# Patient Record
Sex: MALE | Race: BLACK OR AFRICAN AMERICAN | NOT HISPANIC OR LATINO | Employment: OTHER | URBAN - METROPOLITAN AREA
[De-identification: names, ages, dates, MRNs, and addresses within clinical notes are randomized per-mention and may not be internally consistent; named-entity substitution may affect disease eponyms.]

---

## 2023-01-27 ENCOUNTER — HOSPITAL ENCOUNTER (EMERGENCY)
Facility: HOSPITAL | Age: 68
Discharge: HOME | End: 2023-01-27
Attending: EMERGENCY MEDICINE
Payer: MEDICARE

## 2023-01-27 VITALS
OXYGEN SATURATION: 99 % | TEMPERATURE: 98 F | RESPIRATION RATE: 17 BRPM | SYSTOLIC BLOOD PRESSURE: 159 MMHG | DIASTOLIC BLOOD PRESSURE: 102 MMHG | HEART RATE: 72 BPM

## 2023-01-27 DIAGNOSIS — S91.312A LACERATION OF LEFT HEEL, INITIAL ENCOUNTER: Primary | ICD-10-CM

## 2023-01-27 PROCEDURE — 87070 CULTURE OTHR SPECIMN AEROBIC: CPT

## 2023-01-27 PROCEDURE — 63600175 PHARM REV CODE 636 W HCPCS

## 2023-01-27 PROCEDURE — 25000003 PHARM REV CODE 250

## 2023-01-27 PROCEDURE — 99284 EMERGENCY DEPT VISIT MOD MDM: CPT

## 2023-01-27 PROCEDURE — 87077 CULTURE AEROBIC IDENTIFY: CPT | Mod: 59

## 2023-01-27 PROCEDURE — 90715 TDAP VACCINE 7 YRS/> IM: CPT

## 2023-01-27 PROCEDURE — 87186 SC STD MICRODIL/AGAR DIL: CPT

## 2023-01-27 PROCEDURE — 90471 IMMUNIZATION ADMIN: CPT

## 2023-01-27 RX ORDER — HYDROCODONE BITARTRATE AND ACETAMINOPHEN 5; 325 MG/1; MG/1
1 TABLET ORAL EVERY 6 HOURS PRN
Qty: 11 TABLET | Refills: 0 | Status: SHIPPED | OUTPATIENT
Start: 2023-01-27 | End: 2023-01-30

## 2023-01-27 RX ORDER — MUPIROCIN 20 MG/G
1 OINTMENT TOPICAL
Status: COMPLETED | OUTPATIENT
Start: 2023-01-27 | End: 2023-01-27

## 2023-01-27 RX ORDER — CLINDAMYCIN HYDROCHLORIDE 150 MG/1
450 CAPSULE ORAL 3 TIMES DAILY
Qty: 63 CAPSULE | Refills: 0 | Status: SHIPPED | OUTPATIENT
Start: 2023-01-27 | End: 2023-02-03

## 2023-01-27 RX ADMIN — TETANUS TOXOID, REDUCED DIPHTHERIA TOXOID AND ACELLULAR PERTUSSIS VACCINE, ADSORBED 0.5 ML: 5; 2.5; 8; 8; 2.5 SUSPENSION INTRAMUSCULAR at 01:01

## 2023-01-27 RX ADMIN — MUPIROCIN 22 G: 20 OINTMENT TOPICAL at 01:01

## 2023-01-27 NOTE — ED PROVIDER NOTES
Encounter Date: 1/27/2023       History     Chief Complaint   Patient presents with    Laceration     69 yo male to triage for laceration to left ankle. Injury happened Monday while he was in [place]. Just came back to [place] on yesterday. JOSHUA, NAD, AAOx4     68-year-old male with PMHx of GERD presents to ED for emergent evaluation of a 5 cm laceration to left heel that happened 4 days ago.  Patient states that he accidentally cut his heel on a the edge of the bed. He denies any LOC or head trauma. He denies any associated fever, chills, chest pain, SOB, abdominal pain, N/V/D, dysuria, or hematuria. He denies any attempted treatment. He denies any recent abx. No other symptoms reported.    The history is provided by the patient. The history is limited by a language barrier ([id number]). A  was used.   Review of patient's allergies indicates:  No Known Allergies  No past medical history on file.  No past surgical history on file.  No family history on file.     Review of Systems   Constitutional:  Negative for chills and fever.   HENT:  Negative for congestion, ear pain, rhinorrhea and sore throat.    Eyes:  Negative for redness.   Respiratory:  Negative for cough and shortness of breath.    Cardiovascular:  Negative for chest pain.   Gastrointestinal:  Negative for abdominal pain, diarrhea, nausea and vomiting.   Genitourinary:  Negative for decreased urine volume, difficulty urinating, dysuria, frequency, hematuria and urgency.   Musculoskeletal:  Negative for back pain and neck pain.   Skin:  Positive for wound. Negative for rash.   Neurological:  Negative for headaches.        (-) head trauma  (-) LOC   Psychiatric/Behavioral:  Negative for confusion.      Physical Exam     Initial Vitals [01/27/23 1154]   BP Pulse Resp Temp SpO2   (!) 159/102 72 17 98 °F (36.7 °C) 99 %      MAP       --         Physical Exam    Nursing note and vitals reviewed.  Constitutional: He appears well-developed and  well-nourished. He is not diaphoretic.  Non-toxic appearance. No distress.   HENT:   Head: Normocephalic and atraumatic.   Right Ear: External ear normal.   Left Ear: External ear normal.   Nose: Nose normal.   Mouth/Throat: Uvula is midline and oropharynx is clear and moist.   Eyes: Conjunctivae and EOM are normal.   Neck: Neck supple.   Normal range of motion.   Full passive range of motion without pain.     Cardiovascular:            Pulses:       Radial pulses are 2+ on the right side and 2+ on the left side.        Dorsalis pedis pulses are 2+ on the right side and 2+ on the left side.   Musculoskeletal:      Cervical back: Full passive range of motion without pain, normal range of motion and neck supple. No rigidity.      Comments: 5 cm laceration to L heel with some clear drainage. Mild surrounding edema. No erythema or cellulitis. No gaping wounds. Full ROM of b/l ankles, toes, knees, and hips. Patient able to ambulate into the room.      Neurological: He is alert.   Skin: Skin is warm and dry. No rash noted.       ED Course   Procedures  Labs Reviewed   CULTURE, AEROBIC  (SPECIFY SOURCE)          Imaging Results              X-Ray Ankle 2 View Left (Final result)  Result time 01/27/23 12:34:53      Final result by Mckinley Dominguez MD (01/27/23 12:34:53)                   Impression:      See above      Electronically signed by: Mckinley Dominguez MD  Date:    01/27/2023  Time:    12:34               Narrative:    EXAMINATION:  XR ANKLE 2 VIEW LEFT    CLINICAL HISTORY:  laceration;    TECHNIQUE:  Left ankle two views    COMPARISON:  None    FINDINGS:  No fracture or dislocation.  No bone destruction identified.  Small bony spur seen arising from the plantar aspect of the calcaneus .                                       Medications   mupirocin 2 % ointment 22 g (22 g Topical (Top) Given 1/27/23 1316)   Tdap (BOOSTRIX) vaccine injection 0.5 mL (0.5 mLs Intramuscular Given 1/27/23 1332)     Medical Decision Making:    Clinical Tests:   Radiological Study: Ordered and Reviewed  ED Management:  This is a 68-year-old male with PMHx of GERD presents to ED for emergent evaluation of a 5 cm laceration to left heel that happened 4 days ago. On physical exam, patient is well-appearing and in no acute distress.  Nontoxic appearing.  Lungs are clear to auscultation bilaterally.  Abdomen is soft and nontender.  No guarding, rigidity, rebound.  2+ radial pulses bilaterally. 2 + DP pulses bilaterally. He has a 5 cm laceration to L heel with some clear drainage. Mild surrounding edema. No erythema or cellulitis. No gaping wounds. Full ROM of b/l ankles, toes, knees, and hips. Patient able to ambulate into the room.  X-ray revealed no acute fractures or dislocations or foreign bodies.  There is a small bony spur arising from the plantar aspect of the calcaneus.  Wound culture ordered.  Tetanus updated. .  Wound was irrigated well.  Bactroban ointment applied.  Will discharge patient on short course of Norco and clindamycin.  Ambulatory referral to wound care and podiatry ordered.  Urged prompt follow-up with PCP, wound care, and Podiatry for further evaluation.    Strict return precautions given. I discussed with the patient/family the diagnosis, treatment plan, indications for return to the emergency department, and for expected follow-up. The patient/family verbalized an understanding. The patient/family is asked if there are any questions or concerns. We discuss the case, until all issues are addressed to the patient/family's satisfaction. Patient/family understands and is agreeable to the plan. Patient is stable and ready for discharge.                          Clinical Impression:   Final diagnoses:  [S91.312A] Laceration of left heel, initial encounter (Primary)        ED Disposition Condition    Discharge Stable          ED Prescriptions       Medication Sig Dispense Start Date End Date Auth. Provider    clindamycin (CLEOCIN) 150 MG  capsule Take 3 capsules (450 mg total) by mouth 3 (three) times daily. for 7 days 63 capsule 1/27/2023 2/3/2023 Laya Zambrano PA-C    HYDROcodone-acetaminophen (NORCO) 5-325 mg per tablet Take 1 tablet by mouth every 6 (six) hours as needed for Pain. 11 tablet 1/27/2023 1/30/2023 Laya Zambrano PA-C          Follow-up Information       Follow up With Specialties Details Why Contact Info    [facility]  Schedule an appointment as soon as possible for a visit in 2 days for further evaluation [address] [place] [address]  [phone]      [facility] Emergency Dept Emergency Medicine In 2 days If symptoms worsen [address]  [facility] [id number] [phone]             Laya Zambrano PA-C  01/27/23 1606

## 2023-01-27 NOTE — DISCHARGE INSTRUCTIONS
Please return to the Emergency Department for any new or worsening symptoms including: new drainage/redness/swelling, fever, chest pain, shortness of breath, loss of consciousness, dizziness, weakness, or any other concerns.     Please follow up with your Primary Care Provider within in the week. If you do not have one, you may contact the one listed on your discharge paperwork or you may also call the [facility] Appointment Desk at [phone] to schedule an appointment with one.     Please take all medication as prescribed.

## 2023-01-30 LAB — BACTERIA SPEC AEROBE CULT: ABNORMAL

## 2023-02-15 ENCOUNTER — TELEPHONE (OUTPATIENT)
Dept: WOUND CARE | Facility: HOSPITAL | Age: 68
End: 2023-02-15
Payer: MEDICARE

## 2023-02-16 ENCOUNTER — TELEPHONE (OUTPATIENT)
Dept: WOUND CARE | Facility: HOSPITAL | Age: 68
End: 2023-02-16
Payer: MEDICARE

## 2023-02-16 NOTE — TELEPHONE ENCOUNTER
Called  and pt states he is here. Checked waiting room and with registration and not here.   1525 Dr Starr states pt must reschedule.  1530 Called pt to tell to reschedule and went to ER instead will call later to reschedule.

## 2024-06-06 ENCOUNTER — HOSPITAL ENCOUNTER (EMERGENCY)
Facility: HOSPITAL | Age: 69
Discharge: HOME | End: 2024-06-06
Attending: STUDENT IN AN ORGANIZED HEALTH CARE EDUCATION/TRAINING PROGRAM
Payer: MEDICARE

## 2024-06-06 VITALS
WEIGHT: 185 LBS | SYSTOLIC BLOOD PRESSURE: 134 MMHG | OXYGEN SATURATION: 95 % | RESPIRATION RATE: 17 BRPM | TEMPERATURE: 98 F | HEIGHT: 68 IN | HEART RATE: 64 BPM | DIASTOLIC BLOOD PRESSURE: 79 MMHG | BODY MASS INDEX: 28.04 KG/M2

## 2024-06-06 DIAGNOSIS — R06.2 WHEEZING: Primary | ICD-10-CM

## 2024-06-06 DIAGNOSIS — R06.02 SOB (SHORTNESS OF BREATH): ICD-10-CM

## 2024-06-06 LAB
ALBUMIN SERPL BCP-MCNC: 3.8 G/DL (ref 3.5–5.2)
ALP SERPL-CCNC: 80 U/L (ref 55–135)
ALT SERPL W/O P-5'-P-CCNC: 28 U/L (ref 10–44)
ANION GAP SERPL CALC-SCNC: 9 MMOL/L (ref 8–16)
AST SERPL-CCNC: 44 U/L (ref 10–40)
BASOPHILS # BLD AUTO: 0.02 K/UL (ref 0–0.2)
BASOPHILS NFR BLD: 0.4 % (ref 0–1.9)
BILIRUB SERPL-MCNC: 0.4 MG/DL (ref 0.1–1)
BNP SERPL-MCNC: 10 PG/ML (ref 0–99)
BUN SERPL-MCNC: 19 MG/DL (ref 8–23)
CALCIUM SERPL-MCNC: 9.3 MG/DL (ref 8.7–10.5)
CHLORIDE SERPL-SCNC: 109 MMOL/L (ref 95–110)
CO2 SERPL-SCNC: 23 MMOL/L (ref 23–29)
CREAT SERPL-MCNC: 1 MG/DL (ref 0.5–1.4)
CTP QC/QA: YES
D DIMER PPP IA.FEU-MCNC: 0.54 MG/L FEU
DIFFERENTIAL METHOD BLD: ABNORMAL
EOSINOPHIL # BLD AUTO: 0.4 K/UL (ref 0–0.5)
EOSINOPHIL NFR BLD: 7.8 % (ref 0–8)
ERYTHROCYTE [DISTWIDTH] IN BLOOD BY AUTOMATED COUNT: 12.1 % (ref 11.5–14.5)
EST. GFR  (NO RACE VARIABLE): >60 ML/MIN/1.73 M^2
GLUCOSE SERPL-MCNC: 82 MG/DL (ref 70–110)
HCT VFR BLD AUTO: 44.1 % (ref 40–54)
HGB BLD-MCNC: 14.3 G/DL (ref 14–18)
IMM GRANULOCYTES # BLD AUTO: 0 K/UL (ref 0–0.04)
IMM GRANULOCYTES NFR BLD AUTO: 0 % (ref 0–0.5)
LYMPHOCYTES # BLD AUTO: 1.9 K/UL (ref 1–4.8)
LYMPHOCYTES NFR BLD: 40.3 % (ref 18–48)
MAGNESIUM SERPL-MCNC: 2.2 MG/DL (ref 1.6–2.6)
MCH RBC QN AUTO: 29.6 PG (ref 27–31)
MCHC RBC AUTO-ENTMCNC: 32.4 G/DL (ref 32–36)
MCV RBC AUTO: 91 FL (ref 82–98)
MONOCYTES # BLD AUTO: 0.6 K/UL (ref 0.3–1)
MONOCYTES NFR BLD: 12.1 % (ref 4–15)
NEUTROPHILS # BLD AUTO: 1.8 K/UL (ref 1.8–7.7)
NEUTROPHILS NFR BLD: 39.4 % (ref 38–73)
NRBC BLD-RTO: 0 /100 WBC
PLATELET # BLD AUTO: 202 K/UL (ref 150–450)
PMV BLD AUTO: 9.1 FL (ref 9.2–12.9)
POC MOLECULAR INFLUENZA A AGN: NEGATIVE
POC MOLECULAR INFLUENZA B AGN: NEGATIVE
POTASSIUM SERPL-SCNC: 3.8 MMOL/L (ref 3.5–5.1)
PROT SERPL-MCNC: 7 G/DL (ref 6–8.4)
RBC # BLD AUTO: 4.83 M/UL (ref 4.6–6.2)
SODIUM SERPL-SCNC: 141 MMOL/L (ref 136–145)
TROPONIN I SERPL DL<=0.01 NG/ML-MCNC: 0.02 NG/ML (ref 0–0.03)
TSH SERPL DL<=0.005 MIU/L-ACNC: 1.02 UIU/ML (ref 0.4–4)
WBC # BLD AUTO: 4.64 K/UL (ref 3.9–12.7)

## 2024-06-06 PROCEDURE — 80053 COMPREHEN METABOLIC PANEL: CPT | Performed by: EMERGENCY MEDICINE

## 2024-06-06 PROCEDURE — 99285 EMERGENCY DEPT VISIT HI MDM: CPT | Mod: 25

## 2024-06-06 PROCEDURE — 87502 INFLUENZA DNA AMP PROBE: CPT

## 2024-06-06 PROCEDURE — 94640 AIRWAY INHALATION TREATMENT: CPT

## 2024-06-06 PROCEDURE — 25000242 PHARM REV CODE 250 ALT 637 W/ HCPCS: Performed by: STUDENT IN AN ORGANIZED HEALTH CARE EDUCATION/TRAINING PROGRAM

## 2024-06-06 PROCEDURE — 85025 COMPLETE CBC W/AUTO DIFF WBC: CPT | Performed by: EMERGENCY MEDICINE

## 2024-06-06 PROCEDURE — 93005 ELECTROCARDIOGRAM TRACING: CPT

## 2024-06-06 PROCEDURE — 94761 N-INVAS EAR/PLS OXIMETRY MLT: CPT

## 2024-06-06 PROCEDURE — 93010 ELECTROCARDIOGRAM REPORT: CPT | Mod: ,,, | Performed by: INTERNAL MEDICINE

## 2024-06-06 PROCEDURE — 83735 ASSAY OF MAGNESIUM: CPT | Performed by: EMERGENCY MEDICINE

## 2024-06-06 PROCEDURE — 96374 THER/PROPH/DIAG INJ IV PUSH: CPT

## 2024-06-06 PROCEDURE — 84484 ASSAY OF TROPONIN QUANT: CPT | Performed by: EMERGENCY MEDICINE

## 2024-06-06 PROCEDURE — 63600175 PHARM REV CODE 636 W HCPCS: Performed by: STUDENT IN AN ORGANIZED HEALTH CARE EDUCATION/TRAINING PROGRAM

## 2024-06-06 PROCEDURE — 84443 ASSAY THYROID STIM HORMONE: CPT | Performed by: EMERGENCY MEDICINE

## 2024-06-06 PROCEDURE — 83880 ASSAY OF NATRIURETIC PEPTIDE: CPT | Performed by: EMERGENCY MEDICINE

## 2024-06-06 PROCEDURE — 85379 FIBRIN DEGRADATION QUANT: CPT | Performed by: EMERGENCY MEDICINE

## 2024-06-06 RX ORDER — METHYLPREDNISOLONE SOD SUCC 125 MG
125 VIAL (EA) INJECTION
Status: COMPLETED | OUTPATIENT
Start: 2024-06-06 | End: 2024-06-06

## 2024-06-06 RX ORDER — GUAIFENESIN AND DEXTROMETHORPHAN HYDROBROMIDE 10; 100 MG/5ML; MG/5ML
10 SYRUP ORAL EVERY 6 HOURS PRN
Qty: 473 ML | Refills: 0 | Status: SHIPPED | OUTPATIENT
Start: 2024-06-06

## 2024-06-06 RX ORDER — IPRATROPIUM BROMIDE AND ALBUTEROL SULFATE 2.5; .5 MG/3ML; MG/3ML
3 SOLUTION RESPIRATORY (INHALATION)
Status: COMPLETED | OUTPATIENT
Start: 2024-06-06 | End: 2024-06-06

## 2024-06-06 RX ORDER — AZITHROMYCIN 250 MG/1
250 TABLET, FILM COATED ORAL DAILY
Qty: 6 TABLET | Refills: 0 | Status: SHIPPED | OUTPATIENT
Start: 2024-06-06

## 2024-06-06 RX ORDER — ALBUTEROL SULFATE 90 UG/1
1-2 AEROSOL, METERED RESPIRATORY (INHALATION) EVERY 6 HOURS PRN
Qty: 6.7 G | Refills: 0 | Status: SHIPPED | OUTPATIENT
Start: 2024-06-06 | End: 2025-06-06

## 2024-06-06 RX ORDER — PREDNISONE 20 MG/1
40 TABLET ORAL DAILY
Qty: 10 TABLET | Refills: 0 | Status: SHIPPED | OUTPATIENT
Start: 2024-06-06 | End: 2024-06-11

## 2024-06-06 RX ADMIN — METHYLPREDNISOLONE SODIUM SUCCINATE 125 MG: 125 INJECTION, POWDER, FOR SOLUTION INTRAMUSCULAR; INTRAVENOUS at 09:06

## 2024-06-06 RX ADMIN — IPRATROPIUM BROMIDE AND ALBUTEROL SULFATE 3 ML: 2.5; .5 SOLUTION RESPIRATORY (INHALATION) at 10:06

## 2024-06-07 NOTE — DISCHARGE INSTRUCTIONS
I have referred you to a pulmonologist.  Please make an appointment to follow up.  Follow up with your doctor if you have any trouble getting an appointment with a pulmonologist and they can help you find it appointment.

## 2024-06-07 NOTE — ED PROVIDER NOTES
Encounter Date: 6/6/2024    SCRIBE #1 NOTE: I, Cedric Kowalski, am scribing for, and in the presence of,  Reid Blas MD. I have scribed the following portions of the note - Other sections scribed: HPI, ROS, PE.       History     Chief Complaint   Patient presents with    Shortness of Breath     Pt c/o SOB x 3 months. Pt denies any past medical history, cp, n/v/d.     Monisha Baez is a 69 y.o. male, with no pertinent PMHx, who presents to the ED with shortness of breath for x3 months. Patient reports associated symptoms of dry cough and wheezing. Pt further reports that sometimes he coughs up mucus. Pt states that he has not seen a doctor for his issues until today. No other exacerbating or alleviating factors. Denies abdominal pain, CP, fever, chills, leg swelling, leg pain or other associated symptoms.       The history is provided by the patient. No  was used.     Review of patient's allergies indicates:  No Known Allergies  No past medical history on file.  No past surgical history on file.  No family history on file.     Review of Systems   Constitutional:  Negative for chills and fever.   HENT:  Negative for facial swelling and sore throat.    Eyes:  Negative for visual disturbance.   Respiratory:  Positive for cough, shortness of breath and wheezing.    Cardiovascular:  Negative for chest pain, palpitations and leg swelling.   Gastrointestinal:  Negative for abdominal pain, nausea and vomiting.   Genitourinary:  Negative for dysuria and hematuria.   Musculoskeletal:  Negative for back pain and myalgias.   Skin:  Negative for rash.   Neurological:  Negative for weakness and headaches.   Hematological:  Does not bruise/bleed easily.   Psychiatric/Behavioral: Negative.         Physical Exam     Initial Vitals [06/06/24 1851]   BP Pulse Resp Temp SpO2   (!) 160/85 68 18 97.8 °F (36.6 °C) 96 %      MAP       --         Physical Exam    Nursing note and vitals  reviewed.  Constitutional: He appears well-developed and well-nourished. He is not diaphoretic. No distress.   HENT:   Head: Normocephalic and atraumatic.   Right Ear: External ear normal.   Left Ear: External ear normal.   Nose: Nose normal.   Eyes: Conjunctivae are normal. No scleral icterus.   Neck: Neck supple. No tracheal deviation present.   Cardiovascular:  Normal rate, regular rhythm and normal heart sounds.     Exam reveals no gallop and no friction rub.       No murmur heard.  Pulmonary/Chest: Breath sounds normal. No respiratory distress.   Inspiratory and expiratory wheezing noted. No retractions. No excess muscle usage. Speaking full sentences.     Abdominal: Abdomen is soft. Bowel sounds are normal. There is no abdominal tenderness.   Musculoskeletal:      Cervical back: Neck supple.      Comments: No leg swelling.      Neurological: He is alert and oriented to person, place, and time. GCS score is 15. GCS eye subscore is 4. GCS verbal subscore is 5. GCS motor subscore is 6.   Skin: Skin is warm and dry.   Psychiatric: He has a normal mood and affect. Thought content normal.         ED Course   Procedures  Labs Reviewed   COMPREHENSIVE METABOLIC PANEL - Abnormal; Notable for the following components:       Result Value    AST 44 (*)     All other components within normal limits   CBC W/ AUTO DIFFERENTIAL - Abnormal; Notable for the following components:    MPV 9.1 (*)     All other components within normal limits   D DIMER, QUANTITATIVE - Abnormal; Notable for the following components:    D-Dimer 0.54 (*)     All other components within normal limits   B-TYPE NATRIURETIC PEPTIDE   MAGNESIUM   TROPONIN I   TSH   POCT INFLUENZA A/B MOLECULAR   SARS-COV-2 RDRP GENE          Imaging Results              X-Ray Chest AP Portable (Final result)  Result time 06/06/24 19:22:15      Final result by Alex Ramos DO (06/06/24 19:22:15)                   Impression:      No acute  abnormality.      Electronically signed by: Alex Ramos  Date:    06/06/2024  Time:    19:22               Narrative:    EXAMINATION:  XR CHEST AP PORTABLE    CLINICAL HISTORY:  shortness of breath;    TECHNIQUE:  Single frontal view of the chest was performed.    COMPARISON:  None    FINDINGS:  The lungs are well expanded and clear. No focal opacities are seen. The pleural spaces are clear. The cardiac silhouette is unremarkable. The visualized osseous structures are unremarkable.                                       Medications   methylPREDNISolone sodium succinate injection 125 mg (125 mg Intravenous Given 6/6/24 2143)   albuterol-ipratropium 2.5 mg-0.5 mg/3 mL nebulizer solution 3 mL (3 mLs Nebulization Given 6/6/24 2213)     Medical Decision Making  Amount and/or Complexity of Data Reviewed  Labs:  Decision-making details documented in ED Course.  Radiology:  Decision-making details documented in ED Course.    Risk  OTC drugs.  Prescription drug management.            Scribe Attestation:   Scribe #1: I performed the above scribed service and the documentation accurately describes the services I performed. I attest to the accuracy of the note.        ED Course as of 06/06/24 2318   Thu Jun 06, 202406, 2024 2131 X-Ray Chest AP Portable   No acute cardiopulmonary process noted.  Independent interpretation. [CC]   2133   EKG: Rate 64, regular rhythm, sinus rhythm, intervals within normal limits, elevated J-point noted anterior lateral leads, left axis deviation, no concerning ST elevation or depressions noted, normal sinus rhythm with left axis deviation, interpreted by me, reviewed by me. [CC]   2234 D-Dimer(!): 0.54   Age adjusted D-dimer is within normal limits.  I believe PE has been effectively ruled out. [CC]   2317 Patient is a 69-year-old male presenting to the emergency department for evaluation of chronic shortness for breath worsening over the last 3 months.  Inspiratory expiratory wheezing noted on exam.   Vitals are stable he was doing well on room air.  Mildly bradycardic at times.  Denies chest pain.  Troponin is normal BNP normal, chest x-ray is unremarkable, doubt pulmonary edema, effusion, pneumonia, ACS, CHF exacerbation.  Electrolytes within normal limits, doubt derangement.  Kidney function normal, liver function within normal limits except for mildly elevated AST.  Influenza is negative.  He was afebrile.  TSH negative, doubt myxedema coma or hypothyroidism.  Hemoglobin hematocrit within normal limits, doubt symptomatic anemia.  Platelet count is normal.  D-dimer age adjusted normal, doubt PE.  Concern for undiagnosed reactive airway disease versus chronic bronchitis versus atypical pneumonia.  Patient notes some improvement after steroids albuterol in the emergency department.  We will start him on a course of azithromycin, prednisone, we will provide a prescription for albuterol inhaler.  I have referred the patient to pulmonology for continued workup in the outpatient setting which I believe is appropriate.  Strict return precautions given. I discussed with the patient/family the diagnosis, treatment plan, indications for return to the emergency department, and for expected follow-up. The patient/family verbalized an understanding. The patient/family  asked if there are any questions or concerns. We discuss the case, until all issues are addressed to the patient/family's satisfaction. Patient/family understands and is agreeable to the plan. Patient is stable and ready for discharge.   [CC]      ED Course User Index  [CC] Reid Blas MD                             Clinical Impression:  Final diagnoses:  [R06.02] SOB (shortness of breath)  [R06.2] Wheezing (Primary)          ED Disposition Condition    Discharge Stable          ED Prescriptions       Medication Sig Dispense Start Date End Date Auth. Provider    azithromycin (Z-KY) 250 MG tablet Take 1 tablet (250 mg total) by mouth once daily. Take  first 2 tablets together, then 1 every day until finished. 6 tablet 6/6/2024 -- Reid Blas MD    predniSONE (DELTASONE) 20 MG tablet Take 2 tablets (40 mg total) by mouth once daily. for 5 days 10 tablet 6/6/2024 6/11/2024 Reid Blas MD    albuterol (PROVENTIL/VENTOLIN HFA) 90 mcg/actuation inhaler Inhale 1-2 puffs into the lungs every 6 (six) hours as needed for Wheezing. Rescue 6.7 g 6/6/2024 6/6/2025 Reid Blas MD    dextromethorphan-guaiFENesin  mg/5 ml (ROBITUSSIN-DM)  mg/5 mL liquid Take 10 mLs by mouth every 6 (six) hours as needed (cough). 473 mL 6/6/2024 -- Reid Blas MD          Follow-up Information       Follow up With Specialties Details Why Contact Info    [facility] - Emergency Dept Emergency Medicine Go to  If symptoms worsen [address] [facility] [place] [address] [phone]    Your PCP  Schedule an appointment as soon as possible for a visit               I, Reid Blas MD, personally performed the services described in this documentation. All medical record entries made by the scribe were at my direction and in my presence. I have reviewed the chart and agree that the record reflects my personal performance and is accurate and complete.       Reid Blas MD  06/06/24 7717

## 2024-06-08 LAB
OHS QRS DURATION: 94 MS
OHS QTC CALCULATION: 414 MS

## 2024-10-12 ENCOUNTER — HOSPITAL ENCOUNTER (EMERGENCY)
Facility: HOSPITAL | Age: 69
Discharge: HOME | End: 2024-10-12
Attending: EMERGENCY MEDICINE
Payer: MEDICARE

## 2024-10-12 VITALS
RESPIRATION RATE: 18 BRPM | WEIGHT: 180 LBS | TEMPERATURE: 98 F | DIASTOLIC BLOOD PRESSURE: 75 MMHG | BODY MASS INDEX: 27.37 KG/M2 | OXYGEN SATURATION: 100 % | HEART RATE: 60 BPM | SYSTOLIC BLOOD PRESSURE: 137 MMHG

## 2024-10-12 DIAGNOSIS — M25.551 RIGHT HIP PAIN: ICD-10-CM

## 2024-10-12 LAB
BASOPHILS # BLD AUTO: 0 K/UL (ref 0–0.2)
BASOPHILS NFR BLD: 0 % (ref 0–1.9)
CRP SERPL-MCNC: 1.3 MG/L (ref 0–8.2)
DIFFERENTIAL METHOD BLD: ABNORMAL
EOSINOPHIL # BLD AUTO: 0.2 K/UL (ref 0–0.5)
EOSINOPHIL NFR BLD: 4.1 % (ref 0–8)
ERYTHROCYTE [DISTWIDTH] IN BLOOD BY AUTOMATED COUNT: 12 % (ref 11.5–14.5)
ERYTHROCYTE [SEDIMENTATION RATE] IN BLOOD BY PHOTOMETRIC METHOD: 3 MM/HR (ref 0–23)
HCT VFR BLD AUTO: 42.7 % (ref 40–54)
HGB BLD-MCNC: 14.8 G/DL (ref 14–18)
IMM GRANULOCYTES # BLD AUTO: 0.01 K/UL (ref 0–0.04)
IMM GRANULOCYTES NFR BLD AUTO: 0.3 % (ref 0–0.5)
LYMPHOCYTES # BLD AUTO: 1.5 K/UL (ref 1–4.8)
LYMPHOCYTES NFR BLD: 37.6 % (ref 18–48)
MCH RBC QN AUTO: 30.8 PG (ref 27–31)
MCHC RBC AUTO-ENTMCNC: 34.7 G/DL (ref 32–36)
MCV RBC AUTO: 89 FL (ref 82–98)
MONOCYTES # BLD AUTO: 0.4 K/UL (ref 0.3–1)
MONOCYTES NFR BLD: 9.6 % (ref 4–15)
NEUTROPHILS # BLD AUTO: 1.9 K/UL (ref 1.8–7.7)
NEUTROPHILS NFR BLD: 48.4 % (ref 38–73)
NRBC BLD-RTO: 0 /100 WBC
PLATELET # BLD AUTO: 202 K/UL (ref 150–450)
PMV BLD AUTO: 8.9 FL (ref 9.2–12.9)
RBC # BLD AUTO: 4.81 M/UL (ref 4.6–6.2)
WBC # BLD AUTO: 3.86 K/UL (ref 3.9–12.7)

## 2024-10-12 PROCEDURE — 85652 RBC SED RATE AUTOMATED: CPT | Performed by: EMERGENCY MEDICINE

## 2024-10-12 PROCEDURE — 85025 COMPLETE CBC W/AUTO DIFF WBC: CPT | Performed by: EMERGENCY MEDICINE

## 2024-10-12 PROCEDURE — 86140 C-REACTIVE PROTEIN: CPT | Performed by: EMERGENCY MEDICINE

## 2024-10-12 PROCEDURE — 25000003 PHARM REV CODE 250: Performed by: EMERGENCY MEDICINE

## 2024-10-12 PROCEDURE — 99284 EMERGENCY DEPT VISIT MOD MDM: CPT | Mod: 25

## 2024-10-12 RX ORDER — IBUPROFEN 400 MG/1
800 TABLET ORAL
Status: COMPLETED | OUTPATIENT
Start: 2024-10-12 | End: 2024-10-12

## 2024-10-12 RX ADMIN — IBUPROFEN 800 MG: 400 TABLET ORAL at 05:10

## 2024-10-12 NOTE — ED PROVIDER NOTES
Encounter Date: 10/12/2024       History     Chief Complaint   Patient presents with    Back Pain     X a week and worsening tonight, denies injury     69-year-old male presenting for right lower back pain and hip pain.  Patient reports symptom onset 1 week prior.  The patient reports pain radiating from the right lower back anteriorly into the right hip.  Denies any pain.  Reports pain ranging of motion of the hip.  Denies any weight loss, fevers.  Denies any loose stools difficulty urinating.  No analgesia taken prior to arrival.  The patient reports pain with bearing weight on the right hip.      Review of patient's allergies indicates:  No Known Allergies  No past medical history on file.  No past surgical history on file.  No family history on file.     Review of Systems   Constitutional:  Negative for chills and fever.   Respiratory:  Negative for shortness of breath.    Cardiovascular:  Negative for chest pain.   Gastrointestinal:  Negative for abdominal pain and nausea.   Musculoskeletal:  Positive for back pain.        Right hip pain.   Skin:  Negative for rash.   Neurological:  Negative for headaches.       Physical Exam     Initial Vitals [10/12/24 0404]   BP Pulse Resp Temp SpO2   (!) 149/82 64 14 98.1 °F (36.7 °C) 98 %      MAP       --         Physical Exam    Nursing note and vitals reviewed.  Constitutional: He appears well-developed. He is not diaphoretic. No distress.   HENT:   Head: Normocephalic.   Eyes: EOM are normal.   Cardiovascular:  Normal rate and regular rhythm.           No murmur heard.  Pulmonary/Chest: Effort normal and breath sounds normal. He has no wheezes.   Abdominal: Abdomen is soft. He exhibits no distension. There is no abdominal tenderness.   Musculoskeletal:         General: Normal range of motion.      Comments: No midline back tenderness.  Right SI joint tenderness.  Point tenderness over the right hip.     Neurological: He is alert.   Skin: Skin is warm.         ED Course    Procedures  Labs Reviewed   CBC W/ AUTO DIFFERENTIAL - Abnormal       Result Value    WBC 3.86 (*)     RBC 4.81      Hemoglobin 14.8      Hematocrit 42.7      MCV 89      MCH 30.8      MCHC 34.7      RDW 12.0      Platelets 202      MPV 8.9 (*)     Immature Granulocytes 0.3      Gran # (ANC) 1.9      Immature Grans (Abs) 0.01      Lymph # 1.5      Mono # 0.4      Eos # 0.2      Baso # 0.00      nRBC 0      Gran % 48.4      Lymph % 37.6      Mono % 9.6      Eosinophil % 4.1      Basophil % 0.0      Differential Method Automated     SEDIMENTATION RATE    Sed Rate 3     C-REACTIVE PROTEIN    CRP 1.3            Imaging Results              X-Ray Hip 2 or 3 views Right with Pelvis when performed (Final result)  Result time 10/12/24 06:15:58      Final result by Jose De Jesus Shankar MD (10/12/24 06:15:58)                   Impression:      1. No acute displaced fracture or dislocation of the right hip.      Electronically signed by: Jose De Jesus Shankar MD  Date:    10/12/2024  Time:    06:15               Narrative:    EXAMINATION:  XR HIP WITH PELVIS WHEN PERFORMED 2 OR 3 VIEWS RIGHT    CLINICAL HISTORY:  Pain in right hip    TECHNIQUE:  AP view of the pelvis and frog leg lateral view of the right hip were performed.    COMPARISON:  None    FINDINGS:  Two views right hip.    The bilateral sacroiliac joints are intact.  The pubic symphysis is intact.  The bilateral femoroacetabular joints are intact.  No acute displaced fracture or dislocation of the right hip.  No radiopaque foreign body.                                       Medications   ibuprofen tablet 800 mg (800 mg Oral Given 10/12/24 0512)     Medical Decision Making  69-year-old male atraumatic right lower back and right hip pain.  Patient was able to ambulate but with some pain bearing weight.  X-ray shows no fracture.  Patient has point tenderness over the right SI joint.  No elevation in ESR CRP.  No leukocytosis.  Low suspicion for septic cause of the joint  pain.  Suspicion osteoarthritis or sacroiliitis.  Recommendation is to take ibuprofen for the time being follow up with primary care provider.  Discussed if symptoms worsened to seek medical care.        Medical Decision Making:     A. Problem List:  1. Sacroiliitis     B. Differential diagnosis:    Sacroiliitis, hip fracture, hip sprain, tendinitis    Part of the note was done using electronic dictation services.           Amount and/or Complexity of Data Reviewed  Labs: ordered. Decision-making details documented in ED Course.  Radiology: ordered.    Risk  Prescription drug management.               ED Course as of 10/12/24 0635   Sat Oct 12, 2024   0550 CRP: 1.3 [JM]   0550 Sed Rate: 3 [JM]      ED Course User Index  [] Mckinley Sin MD                           Clinical Impression:  Final diagnoses:  [M25.551] Right hip pain          ED Disposition Condition    Discharge Stable          ED Prescriptions    None       Follow-up Information       Follow up With Specialties Details Why Contact Info [facility]  Schedule an appointment as soon as possible for a visit in 1 week Primary care [address] [facility] [address] [phone]    [facility] Emergency Medicine  If symptoms worsen [address] [facility] [place] [address] [phone]             Mckinley Sin MD  10/12/24 0614

## 2024-10-12 NOTE — DISCHARGE INSTRUCTIONS
Follow up with the primary care provider for re-evaluation within the next 2-3 days.    Recommend Tylenol or ibuprofen as needed for pain control.

## 2024-10-12 NOTE — ED NOTES
Pt c/o non-traumatic R lower back pain that radiates to R hip. +CSM distally. No other complaints.     Review of patient's allergies indicates:  No Known Allergies     Patient has verified the spelling of their name and  on armband.   APPEARANCE: Patient is alert, calm, oriented x 4, and does not appear distressed.  SKIN: Skin is normal for race, warm, and dry. Normal skin turgor and mucous membranes moist.  CARDIAC: Normal rate and rhythm, no murmur heard.   RESPIRATORY:Normal rate and effort. Breath sounds clear bilaterally throughout chest. Respirations are equal and unlabored.    GASTRO: Bowel sounds normal, abdomen is soft, no tenderness, and no abdominal distention.  MUSCLE: Full ROM. No bony tenderness or soft tissue tenderness. No obvious deformity.  PERIPHERAL VASCULAR: peripheral pulses present. Normal cap refill. No edema. Warm to touch.  NEURO: 5/5 strength major flexors/extensors bilaterally. Sensory intact to light touch bilaterally. Kary coma scale: eyes open spontaneously-4, oriented & converses-5, obeys commands-6. No neurological abnormalities.   MENTAL STATUS: awake, alert and aware of environment.  : Voids without complication    ED orders in progress. Pt SR up x 2. Bed in lowest position with wheels locked. Call bell within reach of pt.

## 2025-02-11 ENCOUNTER — HOSPITAL ENCOUNTER (EMERGENCY)
Facility: HOSPITAL | Age: 70
Discharge: HOME | End: 2025-02-11
Attending: EMERGENCY MEDICINE
Payer: MEDICARE

## 2025-02-11 VITALS
SYSTOLIC BLOOD PRESSURE: 137 MMHG | OXYGEN SATURATION: 98 % | RESPIRATION RATE: 18 BRPM | HEART RATE: 72 BPM | TEMPERATURE: 98 F | HEIGHT: 68 IN | WEIGHT: 181 LBS | BODY MASS INDEX: 27.43 KG/M2 | DIASTOLIC BLOOD PRESSURE: 82 MMHG

## 2025-02-11 DIAGNOSIS — M54.16 LUMBAR RADICULOPATHY: Primary | ICD-10-CM

## 2025-02-11 DIAGNOSIS — R31.9 HEMATURIA, UNSPECIFIED TYPE: ICD-10-CM

## 2025-02-11 LAB
BILIRUB UR QL STRIP: NEGATIVE
CLARITY UR: CLEAR
COLOR UR: YELLOW
CTP QC/QA: YES
CTP QC/QA: YES
GLUCOSE UR QL STRIP: NEGATIVE
HGB UR QL STRIP: ABNORMAL
KETONES UR QL STRIP: NEGATIVE
LEUKOCYTE ESTERASE UR QL STRIP: NEGATIVE
MICROSCOPIC COMMENT: ABNORMAL
NITRITE UR QL STRIP: NEGATIVE
PH UR STRIP: 6 [PH] (ref 5–8)
POC MOLECULAR INFLUENZA A AGN: NEGATIVE
POC MOLECULAR INFLUENZA B AGN: NEGATIVE
PROT UR QL STRIP: ABNORMAL
RBC #/AREA URNS HPF: 8 /HPF (ref 0–4)
SARS-COV-2 RDRP RESP QL NAA+PROBE: NEGATIVE
SP GR UR STRIP: 1.03 (ref 1–1.03)
URN SPEC COLLECT METH UR: ABNORMAL
UROBILINOGEN UR STRIP-ACNC: NEGATIVE EU/DL

## 2025-02-11 PROCEDURE — 99283 EMERGENCY DEPT VISIT LOW MDM: CPT | Mod: 25

## 2025-02-11 PROCEDURE — 25000003 PHARM REV CODE 250: Performed by: PHYSICIAN ASSISTANT

## 2025-02-11 PROCEDURE — 87502 INFLUENZA DNA AMP PROBE: CPT

## 2025-02-11 PROCEDURE — 87635 SARS-COV-2 COVID-19 AMP PRB: CPT | Performed by: EMERGENCY MEDICINE

## 2025-02-11 PROCEDURE — 81000 URINALYSIS NONAUTO W/SCOPE: CPT | Performed by: PHYSICIAN ASSISTANT

## 2025-02-11 RX ORDER — LIDOCAINE 50 MG/G
1 PATCH TOPICAL DAILY
Qty: 15 PATCH | Refills: 0 | Status: SHIPPED | OUTPATIENT
Start: 2025-02-11 | End: 2025-02-26

## 2025-02-11 RX ORDER — ACETAMINOPHEN 500 MG
500 TABLET ORAL EVERY 4 HOURS PRN
Qty: 20 TABLET | Refills: 0 | Status: SHIPPED | OUTPATIENT
Start: 2025-02-11 | End: 2025-02-16

## 2025-02-11 RX ORDER — LIDOCAINE 50 MG/G
1 PATCH TOPICAL
Status: DISCONTINUED | OUTPATIENT
Start: 2025-02-11 | End: 2025-02-11 | Stop reason: HOSPADM

## 2025-02-11 RX ORDER — ACETAMINOPHEN 500 MG
500 TABLET ORAL
Status: COMPLETED | OUTPATIENT
Start: 2025-02-11 | End: 2025-02-11

## 2025-02-11 RX ADMIN — ACETAMINOPHEN 500 MG: 500 TABLET, FILM COATED ORAL at 11:02

## 2025-02-11 RX ADMIN — LIDOCAINE 5% 1 PATCH: 700 PATCH TOPICAL at 11:02

## 2025-02-11 NOTE — Clinical Note
"[employer] "Angelo Baez was seen and treated in our emergency department on 2/11/2025.  He may return to work on 02/14/2025.       If you have any questions or concerns, please don't hesitate to call.      Jennifer Sin PA-C"

## 2025-02-11 NOTE — DISCHARGE INSTRUCTIONS

## 2025-02-11 NOTE — ED PROVIDER NOTES
"Encounter Date: 2/11/2025    SCRIBE #1 NOTE: I, Meredith Posadas, am scribing for, and in the presence of,  Jennifer Sin PA-C. I have scribed the following portions of the note - Other sections scribed: HPI,ROS,PE.       History     Chief Complaint   Patient presents with    Nasal Congestion     Pt c/o nasal congestion and generalized body aches since yesterday. Afebrile. Pt states "When I stand I feel pain and ache"      CC: Lower back pain    HPI: Monisha Baez, a 70 y.o. male with no pertinent PMHx, presents to the ED with lower back pain onset 1 day ago. Patient reports an associated symptoms of sneezing. Patient reports that he has lower back pain that radiates to his lower abdomen. Patient denies taking any medications to alleviate his symptoms. Patient notes that his symptoms have slightly alleviated since onset. Denies chest pain, rhinorrhea, abdominal pain, fever, chills, nausea, vomiting ,diarrhea, constipation, weakness, numbness, tingling, dysuria, difficulty urinating, bladder/bowel incontinence or hematuria. Denies any strenuous activity.     The history is provided by the patient. A  was used (#[id number]).     Review of patient's allergies indicates:  No Known Allergies  History reviewed. No pertinent past medical history.  History reviewed. No pertinent surgical history.  No family history on file.  Social History     Tobacco Use    Smoking status: Never    Smokeless tobacco: Never     Review of Systems   Constitutional:  Negative for chills and fever.   HENT:  Positive for sneezing. Negative for congestion, ear pain, rhinorrhea and sore throat.    Eyes:  Negative for redness.   Respiratory:  Negative for shortness of breath and stridor.    Cardiovascular:  Negative for chest pain.   Gastrointestinal:  Negative for abdominal pain, constipation, diarrhea, nausea and vomiting.   Genitourinary:  Negative for difficulty urinating, dysuria, frequency, hematuria and urgency. "        (-)Bladder/bowel incontinence   Musculoskeletal:  Positive for back pain. Negative for neck pain.   Skin:  Negative for rash.   Neurological:  Negative for dizziness, speech difficulty, weakness, light-headedness and numbness.        (-)Tingling   Hematological:  Does not bruise/bleed easily.   Psychiatric/Behavioral:  Negative for confusion.        Physical Exam     Initial Vitals [02/11/25 1015]   BP Pulse Resp Temp SpO2   133/71 88 18 99 °F (37.2 °C) 98 %      MAP       --         Physical Exam    Nursing note and vitals reviewed.  Constitutional: He appears well-developed and well-nourished.  Non-toxic appearance. He does not have a sickly appearance. No distress.   HENT:   Head: Normocephalic.   Right Ear: Hearing and external ear normal.   Left Ear: Hearing and external ear normal.   Nose: Nose normal. Mouth/Throat: Oropharynx is clear and moist. No trismus in the jaw. No oropharyngeal exudate, posterior oropharyngeal edema or posterior oropharyngeal erythema.   Eyes: Conjunctivae and EOM are normal.   Neck: Neck supple. No tracheal deviation present.   Normal range of motion.  Cardiovascular:  Normal rate and regular rhythm.     Exam reveals no gallop and no friction rub.       No murmur heard.  Pulses:       Radial pulses are 2+ on the right side and 2+ on the left side.        Posterior tibial pulses are 2+ on the right side and 2+ on the left side.   Pulmonary/Chest: Breath sounds normal. No tachypnea and no bradypnea. No respiratory distress. He has no wheezes. He has no rhonchi. He has no rales.   Abdominal: Abdomen is soft. Bowel sounds are normal. He exhibits no distension. There is no abdominal tenderness. There is no rebound and no guarding.   Musculoskeletal:      Cervical back: Normal range of motion and neck supple.      Comments: No midline tenderness. No paraspinal ttp        Lymphadenopathy:     He has no cervical adenopathy.   Neurological: He is alert and oriented to person, place, and  time. GCS eye subscore is 4. GCS verbal subscore is 5. GCS motor subscore is 6.   Skin: Skin is warm and dry. No rash noted.   Psychiatric: He has a normal mood and affect. His behavior is normal. Judgment and thought content normal.         ED Course   Procedures  Labs Reviewed   URINALYSIS, REFLEX TO URINE CULTURE - Abnormal       Result Value    Specimen UA Urine, Clean Catch      Color, UA Yellow      Appearance, UA Clear      pH, UA 6.0      Specific Gravity, UA 1.030      Protein, UA Trace (*)     Glucose, UA Negative      Ketones, UA Negative      Bilirubin (UA) Negative      Occult Blood UA 1+ (*)     Nitrite, UA Negative      Urobilinogen, UA Negative      Leukocytes, UA Negative      Narrative:     Specimen Source->Urine   URINALYSIS MICROSCOPIC - Abnormal    RBC, UA 8 (*)     Microscopic Comment SEE COMMENT      Narrative:     Specimen Source->Urine   POCT INFLUENZA A/B MOLECULAR    POC Molecular Influenza A Ag Negative      POC Molecular Influenza B Ag Negative       Acceptable Yes     SARS-COV-2 RDRP GENE    POC Rapid COVID Negative       Acceptable Yes            Imaging Results              X-Ray Lumbar Spine Ap And Lateral (Final result)  Result time 02/11/25 11:54:35      Final result by Jose De Jesus Shankar MD (02/11/25 11:54:35)                   Impression:      1. No acute displaced fracture or dislocation of the lumbar spine.      Electronically signed by: Jose De Jesus Shankar MD  Date:    02/11/2025  Time:    11:54               Narrative:    EXAMINATION:  XR LUMBAR SPINE AP AND LATERAL    CLINICAL HISTORY:  back pain;    TECHNIQUE:  AP, lateral and spot images were performed of the lumbar spine.    COMPARISON:  None    FINDINGS:  Three views lumbar spine.    Lateral imaging demonstrate minimal grade 1 anterolisthesis of L4 on L5.  The facet joints are aligned noting multilevel facet arthropathy.  The sacral coccygeal segments are aligned.  AP spinal alignment is  remarkable for dextroscoliotic curvature.  The bilateral sacroiliac joints are intact.                                       Medications   acetaminophen tablet 500 mg (500 mg Oral Given 2/11/25 1139)     Medical Decision Making    70-year-old  presenting for lumbar back pain radiating into the lower abdomen.  Exam above.  No midline tenderness or paraspinal tenderness.  No neurovascular deficits. no incontinence or saddle anesthesia  Considered doubt cauda equina epidural abscess or cord compression.  UA with hematuria.  Denies any dysuria, urinary urgency frequency difficulty urinating.  Considered doubt UTI.  Does not appear uncomfortable.  Considered but low suspicion for obstructive uropathy.  Will refer to Urology for further evaluation management of microscopic hematuria.  No evidence of acute surgical abdomen at this time.  Will have patient follow up primary care in 2 days.  Will have patient return ER for worsening or as needed.  X-ray of the lumbar spine with no fracture dislocation.  Remarkable for  Degenerative changes may be contributing to his symptoms.  Considered lumbar radiculopathy.  No evidence of fracture dislocation or metastatic changes. Flu covid negative. Tylenol lidoderm given.    Amount and/or Complexity of Data Reviewed  Labs: ordered.  Radiology: ordered.    Risk  OTC drugs.  Prescription drug management.            Scribe Attestation:   Scribe #1: I performed the above scribed service and the documentation accurately describes the services I performed. I attest to the accuracy of the note.              I, Jennifer Sin PA-C , personally performed the services described in this documentation. All medical record entries made by the scribe were at my direction and in my presence. I have reviewed the chart and agree that the record reflects my personal performance and is accurate and complete.      DISCLAIMER: This note was prepared with [employer] voice recognition transcription software.  Garbled syntax, mangled pronouns, and other bizarre constructions may be attributed to that software system.                  Clinical Impression:  Final diagnoses:  [M54.16] Lumbar radiculopathy (Primary)  [R31.9] Hematuria, unspecified type          ED Disposition Condition    Discharge Stable          ED Prescriptions       Medication Sig Dispense Start Date End Date Auth. Provider    acetaminophen (TYLENOL) 500 MG tablet Take 1 tablet (500 mg total) by mouth every 4 (four) hours as needed. 20 tablet 2/11/2025 2/16/2025 Jennifer Sin PA-C    LIDOcaine (LIDODERM) 5 % Place 1 patch onto the skin once daily. Remove & Discard patch within 12 hours or as directed by MD. May use 4% over the counter if not covered by insurance for 15 days 15 patch 2/11/2025 2/26/2025 Jennifer Sin PA-C          Follow-up Information       Follow up With Specialties Details Why Contact Info    Your Primary Care Doctor  Schedule an appointment as soon as possible for a visit in 2 days      [facility] Emergency Dept Emergency Medicine Go to  As needed, If symptoms worsen [address] [facility] [place] [address] [phone]    [facility] UROLOGY Urology Schedule an appointment as soon as possible for a visit in 2 days for follow up [address] [facility] [place] [id number] [phone]    [facility] Emergency Dept Emergency Medicine Go to  As needed, If symptoms worsen [address] [facility] [place] [id number] [phone]    [facility] PAIN MANAGEMENT Pain Medicine   [address] [facility] [place] [phone]             Jennifer Sin PA-C  02/11/25 1752       Jennifer Sin PA-C  02/11/25 1755

## 2025-03-21 ENCOUNTER — TELEPHONE (OUTPATIENT)
Dept: UROLOGY | Facility: CLINIC | Age: 70
End: 2025-03-21
Payer: MEDICARE

## 2025-03-31 ENCOUNTER — OFFICE VISIT (OUTPATIENT)
Dept: UROLOGY | Facility: CLINIC | Age: 70
End: 2025-03-31
Payer: MEDICARE

## 2025-03-31 VITALS — BODY MASS INDEX: 27.49 KG/M2 | WEIGHT: 180.75 LBS

## 2025-03-31 DIAGNOSIS — R31.9 HEMATURIA, UNSPECIFIED TYPE: ICD-10-CM

## 2025-03-31 DIAGNOSIS — R35.1 NOCTURIA: ICD-10-CM

## 2025-03-31 DIAGNOSIS — Z12.5 SCREENING FOR PROSTATE CANCER: Primary | ICD-10-CM

## 2025-03-31 PROCEDURE — 99999 PR PBB SHADOW E&M-EST. PATIENT-LVL III: CPT | Mod: PBBFAC,,, | Performed by: STUDENT IN AN ORGANIZED HEALTH CARE EDUCATION/TRAINING PROGRAM

## 2025-03-31 PROCEDURE — G2211 COMPLEX E/M VISIT ADD ON: HCPCS | Mod: S$PBB,,, | Performed by: STUDENT IN AN ORGANIZED HEALTH CARE EDUCATION/TRAINING PROGRAM

## 2025-03-31 PROCEDURE — 99214 OFFICE O/P EST MOD 30 MIN: CPT | Mod: S$PBB,,, | Performed by: STUDENT IN AN ORGANIZED HEALTH CARE EDUCATION/TRAINING PROGRAM

## 2025-03-31 PROCEDURE — 99213 OFFICE O/P EST LOW 20 MIN: CPT | Mod: PBBFAC | Performed by: STUDENT IN AN ORGANIZED HEALTH CARE EDUCATION/TRAINING PROGRAM

## 2025-03-31 NOTE — PROGRESS NOTES
Patient ID: Monisha Baez is a 70 y.o. male.    Chief Complaint: Hematuria evaluation  Referral: Jennifer Sin PA-C  [address] [place] [address]     HPI  70 y.o. who presents to the Urology clinic for evaluation of blood in the urine, for which he was unaware. He was seen in the ED for nasal congestion  and lower back pain on 2/11/25 when he was noted to have hematuria. Patient denies gross hematuria. Microscopic UA revealed 8 RBC in the urine. Patient without hx of UTI.  No hx of UTIs, kidney stones,  nausea, or vomiting. Occasional back pain, right flank for the past 1 month.   Notes occasional nocturia and urinary hesitancy- 1-2 months ago   No smoking history.   .     Medically Necessary ROS documented in HPI    Past Medical History  Active Ambulatory Problems     Diagnosis Date Noted    No Active Ambulatory Problems     Resolved Ambulatory Problems     Diagnosis Date Noted    No Resolved Ambulatory Problems     No Additional Past Medical History         Past Surgical History  No past surgical history on file.    Social History       Medications  Current Medications[1]    Allergies  Review of patient's allergies indicates:  No Known Allergies    Patient's PMH, FH, Social hx, Medications, allergies reviewed and updated as pertinent to today's visit    Objective:      Physical Exam  Constitutional:       General: He is not in acute distress.     Appearance: He is well-developed. He is not ill-appearing, toxic-appearing or diaphoretic.   HENT:      Head: Normocephalic and atraumatic.      Mouth/Throat:      Mouth: Mucous membranes are moist.   Eyes:      Conjunctiva/sclera: Conjunctivae normal.   Pulmonary:      Effort: Pulmonary effort is normal. No respiratory distress.   Abdominal:      General: Abdomen is flat. There is no distension.      Palpations: Abdomen is soft. There is no mass.      Tenderness: There is no right CVA tenderness or left CVA tenderness.   Musculoskeletal:          General: No swelling or deformity.      Cervical back: Neck supple.   Skin:     Findings: No rash.   Neurological:      Mental Status: He is alert and oriented to person, place, and time.      Gait: Gait normal.   Psychiatric:         Mood and Affect: Mood normal.         Thought Content: Thought content normal.         Judgment: Judgment normal.             Assessment:       1. Screening for prostate cancer    2. Hematuria, unspecified type    3. Nocturia        Plan:       Screening for prostate cancer  PSA due    Nocturia  RBUS to measure prostate  No evidence of urinary retention  Can consider flomax rx at follow up      Microscopic hematuria  Ddx: urolithiasis, neoplasm of bladder/ kidney/ ureter, cystitis, urethritis, prostatitis  2/11/2025 Microscopic hematuria, 8 RBC seen on microscopic analysis on 2/11/205  No smoking history  Discussed RBUS to eval anatomy along with awake cystoscopy with local anesthesia according to low risk/ stratification of AUA guidelines  Visual aides used for added understanding      Visit today included increased complexity associated with the care of the episodic problem as above addressed and managing the longitudinal care of the patient due to the serious and/or complex managed problem(s) as above.      French/creole  Susan [id number]         [1]   Current Outpatient Medications:     albuterol (PROVENTIL/VENTOLIN HFA) 90 mcg/actuation inhaler, Inhale 1-2 puffs into the lungs every 6 (six) hours as needed for Wheezing. Rescue, Disp: 6.7 g, Rfl: 0    azithromycin (Z-KY) 250 MG tablet, Take 1 tablet (250 mg total) by mouth once daily. Take first 2 tablets together, then 1 every day until finished., Disp: 6 tablet, Rfl: 0    dextromethorphan-guaiFENesin  mg/5 ml (ROBITUSSIN-DM)  mg/5 mL liquid, Take 10 mLs by mouth every 6 (six) hours as needed (cough)., Disp: 473 mL, Rfl: 0

## 2025-04-06 ENCOUNTER — HOSPITAL ENCOUNTER (EMERGENCY)
Facility: HOSPITAL | Age: 70
Discharge: HOME | End: 2025-04-06
Attending: EMERGENCY MEDICINE
Payer: MEDICARE

## 2025-04-06 VITALS
DIASTOLIC BLOOD PRESSURE: 86 MMHG | OXYGEN SATURATION: 95 % | RESPIRATION RATE: 20 BRPM | HEIGHT: 68 IN | WEIGHT: 180 LBS | TEMPERATURE: 98 F | HEART RATE: 68 BPM | BODY MASS INDEX: 27.28 KG/M2 | SYSTOLIC BLOOD PRESSURE: 154 MMHG

## 2025-04-06 DIAGNOSIS — J18.9 PNEUMONIA OF RIGHT LUNG DUE TO INFECTIOUS ORGANISM, UNSPECIFIED PART OF LUNG: Primary | ICD-10-CM

## 2025-04-06 DIAGNOSIS — R05.9 COUGH: ICD-10-CM

## 2025-04-06 DIAGNOSIS — J06.9 UPPER RESPIRATORY TRACT INFECTION, UNSPECIFIED TYPE: ICD-10-CM

## 2025-04-06 LAB
CTP QC/QA: YES
CTP QC/QA: YES
POC MOLECULAR INFLUENZA A AGN: NEGATIVE
POC MOLECULAR INFLUENZA B AGN: NEGATIVE
SARS-COV-2 RDRP RESP QL NAA+PROBE: NEGATIVE

## 2025-04-06 PROCEDURE — 99284 EMERGENCY DEPT VISIT MOD MDM: CPT | Mod: 25

## 2025-04-06 PROCEDURE — 87502 INFLUENZA DNA AMP PROBE: CPT

## 2025-04-06 PROCEDURE — 87635 SARS-COV-2 COVID-19 AMP PRB: CPT

## 2025-04-06 RX ORDER — ACETAMINOPHEN 500 MG
500 TABLET ORAL EVERY 6 HOURS PRN
Qty: 30 TABLET | Refills: 0 | Status: SHIPPED | OUTPATIENT
Start: 2025-04-06

## 2025-04-06 RX ORDER — BENZONATATE 200 MG/1
200 CAPSULE ORAL 3 TIMES DAILY PRN
Qty: 30 CAPSULE | Refills: 0 | Status: SHIPPED | OUTPATIENT
Start: 2025-04-06 | End: 2025-04-16

## 2025-04-06 RX ORDER — DOXYCYCLINE 100 MG/1
100 CAPSULE ORAL 2 TIMES DAILY
Qty: 20 CAPSULE | Refills: 0 | Status: SHIPPED | OUTPATIENT
Start: 2025-04-06 | End: 2025-04-16

## 2025-04-06 RX ORDER — FLUTICASONE PROPIONATE 50 MCG
1 SPRAY, SUSPENSION (ML) NASAL 2 TIMES DAILY PRN
Qty: 15 G | Refills: 0 | Status: SHIPPED | OUTPATIENT
Start: 2025-04-06

## 2025-04-06 NOTE — DISCHARGE INSTRUCTIONS
Complete antibiotics as prescribed. Take tylenol or ibuprofen as directed for pain and fever.     Thank you for coming to our Emergency Department today. It is important to remember that some problems or medical conditions are difficult to diagnose and may not be found or addressed during your Emergency Department visit.  These conditions often start with non-specific symptoms and can only be diagnosed on follow up visits with your primary care physician or specialist when the symptoms continue or change. Please remember that all medical conditions can change, and we cannot predict how you will be feeling tomorrow or the next day. Return to the ER with any questions/concerns, new/concerning symptoms, worsening or failure to improve.       Be sure to follow up with your primary care doctor and review all labs/imaging/tests that were performed during your ER visit with them. It is very common for us to identify non-emergent incidental findings which must be followed up with your primary care physician.  Some labs/imaging/tests may be outside of the normal range, and require non-emergent follow-up and/or further investigation/treatment/procedures/testing to help diagnose/exclude/prevent complications or other potentially serious medical conditions. Some abnormalities may not have been discussed or addressed during your ER visit.     An ER visit does not replace a primary care visit, and many screening tests or follow-up tests cannot be ordered by an ER doctor or performed by the ER. Some tests may even require pre-approval.    If you do not have a primary care doctor, you may contact the one listed on your discharge paperwork or you may also call the [facility] Appointment Desk at [phone] , or [address] [place] [address] at  [phone] to schedule an appointment, or establish care with a primary care doctor or even a specialist and to obtain information about local resources. It is important to your health that you have  a primary care doctor.    Please take all medications as directed. We have done our best to select a medication for you that will treat your condition however, all medications may potentially have side-effects and it is impossible to predict which medications may give you side-effects or what those side-effects (if any) those medications may give you.  If you feel that you are having a negative effect or side-effect of any medication you should stop taking those medications immediately and seek medical attention. If you feel that you are having a life-threatening reaction call 911.        Do not drive, swim, climb to height, take a bath, operate heavy machinery, drink alcohol or take potentially sedating medications, sign any legal documents or make any important decisions for 24 hours if you have received any pain medications, sedatives or mood altering drugs during your ER visit or within 24 hours of taking them if they have been prescribed to you.     You can find additional resources for Dentists, hearing aids, durable medical equipment, low cost pharmacies and other resources at [email or web address]  Ranpli antibyotik roderick yo preskri. Pran tylenol oswa ibipwofèn roderick yo mande abraham doulè ak lafyèv.    Mèsi paske w te faustino nan Depatman Ijans nou an derrick a. Clarice enpòtan abraham sonje ke kèk pwoblèm oswa kondisyon medikal yo difisil abraham dyagnostike epi yo ka pa jwenn oswa adrese pandan vizit Depatman Ijans ou. Kondisyon sa yo souvan kòmanse ak sentòm ki pa espesifik epi yo ka sèlman dyagnostike nan vizit swivi ak doktè prensipal ou oswa espesyalis lè sentòm yo kontinye oswa chanje. Tanpri sonje ke tout kondisyon medikal yo ka chanje, epi nou pa ka predi ki roderick ou pral porsche w demen oswa demen. Retounen nan ER a ak nenpòt kesyon/enkyetid, nouvo/konsènan sentòm, min pi grav oswa echèk abraham amelyore.      Asire w ou fè swivi ak doktè swen prensipal ou a epi revize tout laboratwa/imaj/tès yo te fè pandan vizit ER ou avèk yo.  Li trè komen abraham nou idantifye rezilta ensidan ki pa ijans ki dwe swiv ak doktè prensipal ou. Gen kèk laboratwa/imaj/tès ki ka andeyò ranje nòmal la, epi mande abraham swivi ki pa ijans ak/oswa plis envestigasyon/tretman/pwosedi/tès abraham reyes fè dyagnostik/ekskli/anpeche konplikasyon oswa lòt kondisyon medikal ki kapab grav. Gen kèk anomali ki ka pa te diskite oswa adrese pandan vizit ER ou a.    Yon vizit ER pa ranplase yon vizit swen prensipal, epi yon doktè ER pa ka bay lòd abraham anpil tès depistaj oswa tès swivi oswa fè pa ER la. Gen kèk tès ki ka menm mande abraham apwobasyon davans.    Si ou pa gen yon doktè swen prensipal, ou ka kontakte cris ki endike rut papye egzeyat ou a oswa ou ka rele biwo randevou klinik [facility] [phone], oswa [facility] [phone] abraham pran yon randevou, oswa etabli swen ak yon espesyalis ak yon doktè abraham jwenn enfòmasyon rut resous lokal yo. Li enpòtan abraham sante ou ke ou gen yon doktè swen prensipal.    Tanpri pran tout medikaman roderick yo mande yo. Nou te fè tout sa nou kapab abraham chwazi yon medikaman abraham ou ki pral trete kondisyon ou, sepandan, tout medikaman yo kapab potansyèlman gen efè segondè epi li enposib abraham predi ki medikaman ki ka ba ou efè segondè oswa ki efè segondè sa yo (si genyen) medikaman sa yo ka ba ou. Si w porsche w gen yon efè negatif oswa yon efè segondè nenpòt medikaman, ou ta dwe sispann pran [facility] sa yo imedyatman epi chèche swen [facility]. Si w porsche w gen yon reyaksyon ki menase theo w, rele 911.        Andrew mckee, vikash, cyndi gómez wotè, benmackenzie, opere jeremiah beena, bwè alkòl oswa pran [facility] ki kapab sedatif, siyen nenpòt dokiman legal oswa pran nenpòt desizyon enpòtan abraham 24 èdtan si ou te resevwa nenpòt [facility] abraham doulè, sedatif oswa dwòg ki chanje atitid pandan vizit ER ou oswa nan lespas 24 èdtan apre w pran yo si yo te preskri ou.    Ou ka jwenn resous adisyonèl abraham Dantis, aparèy abraham tande, ekipman medikal dirab, famasi a bari ki ba ak lòt resous  dalia [email or web address]

## 2025-04-06 NOTE — ED PROVIDER NOTES
Encounter Date: 4/6/2025       History     Chief Complaint   Patient presents with    URI     Pt presents to the ED with c/o cold symptoms and subjective fever beginning Friday night.      Patient is a 70 year old male presenting for evaluation of URI symptoms.  States patient a 2 day history of runny nose, cough, and suggest a fever.  Patient did not take his temperature at home.  Patient is not taking medications for symptoms.  Reports he has occasional shortness of breath, that he typically has been he is sick.  He denies abdominal pain, chest pain, vomiting, or diarrhea.    The history is provided by the patient. The history is limited by a language barrier. A  was used.     Review of patient's allergies indicates:  No Known Allergies  No past medical history on file.  No past surgical history on file.  No family history on file.  Social History[1]  Review of Systems   Constitutional:  Positive for fever.   Respiratory:  Positive for cough and shortness of breath.    Cardiovascular:  Negative for chest pain.   Gastrointestinal:  Negative for abdominal pain, diarrhea and vomiting.       Physical Exam     Initial Vitals [04/06/25 1342]   BP Pulse Resp Temp SpO2   133/67 95 18 98.6 °F (37 °C) 98 %      MAP       --         Physical Exam    ED Course   Procedures  Labs Reviewed   SARS-COV-2 RDRP GENE       Result Value    POC Rapid COVID Negative       Acceptable Yes     POCT INFLUENZA A/B MOLECULAR    POC Molecular Influenza A Ag Negative      POC Molecular Influenza B Ag Negative       Acceptable Yes            Imaging Results              X-Ray Chest PA And Lateral (Final result)  Result time 04/06/25 15:03:45      Final result by Kathe Raphael MD (04/06/25 15:03:45)                   Impression:      1. The pulmonary findings may be related to the poor inspiratory effort however, I am concerned about early right upper lobe and possible right lower lobe  pneumonia.  Follow-up radiographs are recommended in 6-8 weeks to document resolution.      Electronically signed by: Kathe Raphael MD  Date:    04/06/2025  Time:    15:03               Narrative:    EXAMINATION:  XR CHEST PA AND LATERAL    CLINICAL HISTORY:  Cough, unspecified    TECHNIQUE:  PA and lateral views of the chest were performed.    COMPARISON:  06/06/2024.    FINDINGS:  The lungs are symmetrically hypoinflated and demonstrate subtle airspace opacification in the right upper lobe and the right lower lobe.  There is no pneumothorax, pleural effusion or mass.    The cardiomediastinal silhouette is normal.    The osseous and soft tissue structures are normal.                                       Medications - No data to display  Medical Decision Making  Patient is a 70 year old male presenting for evaluation of URI symptoms.     Differential includes but not limited to COVID, influenza, strep pharyngitis, viral URI, pneumonia however less likely due to lack of fever or chest pain.    Patient is alert and afebrile.  Patient is nontoxic appearing, not in distress.  Vitals within normal limits.  On physical exam patient ambulating without difficulty.  Lungs are clear to auscultation.  Patient is speaking in full sentences without difficulty.  Normal heart rate and rhythm.  No posterior oropharyngeal erythema, tonsillar swelling, tonsillar exudates.  Uvula is midline.  Abdomen is soft and nondistended.  No abdominal tenderness rebound or guarding.  Negative McBurney's or Ames's sign.  No signs of fluid overload.  No leg edema.  Strong distal radial pulse bilaterally.    Covid and Influenza are negative. Chest x-ray is concerning for possible early right lobe pneumonia vs poor inspiration.   Discussed with patient needing for antibiotics for possible pneumonia.  Doxycycline prescribed.  Advised patient to take antibiotics as prescribed to completion. Recommended patient taking Tylenol or ibuprofen as  directed for fever and pain.  Discussed medications for cough and congestion as well.  Advised patient of the importance of maintaining oral hydration, and eating healthy diet.  Recommended follow up PCP in 2 days.  Return precautions given for new or worsening symptoms.  Patient is agreeable to this plan, expresses understanding of return precautions and follow up plan. I thoroughly answered all patient's questions and concerns.  Patient is stable for discharge at this time.    Amount and/or Complexity of Data Reviewed  Labs: ordered.  Radiology: ordered.    Risk  OTC drugs.  Prescription drug management.                                      Clinical Impression:  Final diagnoses:  [R05.9] Cough  [J18.9] Pneumonia of right lung due to infectious organism, unspecified part of lung (Primary)  [J06.9] Upper respiratory tract infection, unspecified type          ED Disposition Condition    Discharge Stable          ED Prescriptions       Medication Sig Dispense Start Date End Date Auth. Provider    doxycycline (VIBRAMYCIN) 100 MG Cap Take 1 capsule (100 mg total) by mouth 2 (two) times daily. for 10 days 20 capsule 4/6/2025 4/16/2025 Jessica Peterson PA-C    benzonatate (TESSALON) 200 MG capsule Take 1 capsule (200 mg total) by mouth 3 (three) times daily as needed for Cough. 30 capsule 4/6/2025 4/16/2025 Jessica Peterson PA-C    acetaminophen (TYLENOL) 500 MG tablet Take 1 tablet (500 mg total) by mouth every 6 (six) hours as needed for Pain. 30 tablet 4/6/2025 -- Jessica Peterson PA-C    fluticasone propionate (FLONASE) 50 mcg/actuation nasal spray 1 spray (50 mcg total) by Each Nostril route 2 (two) times daily as needed for Rhinitis. 15 g 4/6/2025 -- Jessica Peterson PA-C          Follow-up Information       Follow up With Specialties Details Why Contact Info    [facility] [place] - Emergency Dept Emergency Medicine Go to  If new symptoms develop or symptoms worsen [address] [facility] [id number] [phone]    Your Primary Care Provider  Schedule an appointment as soon as possible for a visit in 2 days For follow up                  [1]   Social History  Tobacco Use    Smoking status: Never    Smokeless tobacco: Never        Jessica Peterson, PA-C  04/06/25 1535

## 2025-04-07 ENCOUNTER — HOSPITAL ENCOUNTER (OUTPATIENT)
Dept: RADIOLOGY | Facility: HOSPITAL | Age: 70
Discharge: HOME | End: 2025-04-07
Attending: STUDENT IN AN ORGANIZED HEALTH CARE EDUCATION/TRAINING PROGRAM
Payer: MEDICARE

## 2025-04-07 DIAGNOSIS — R31.9 HEMATURIA, UNSPECIFIED TYPE: ICD-10-CM

## 2025-04-07 PROCEDURE — 76770 US EXAM ABDO BACK WALL COMP: CPT | Mod: 26,,, | Performed by: RADIOLOGY

## 2025-04-07 PROCEDURE — 76770 US EXAM ABDO BACK WALL COMP: CPT | Mod: TC

## 2025-04-14 ENCOUNTER — PROCEDURE VISIT (OUTPATIENT)
Dept: UROLOGY | Facility: CLINIC | Age: 70
End: 2025-04-14
Payer: MEDICARE

## 2025-04-14 VITALS — WEIGHT: 176.06 LBS | BODY MASS INDEX: 26.77 KG/M2

## 2025-04-14 DIAGNOSIS — R31.9 HEMATURIA, UNSPECIFIED TYPE: ICD-10-CM

## 2025-04-14 PROCEDURE — 52000 CYSTOURETHROSCOPY: CPT | Mod: PBBFAC | Performed by: STUDENT IN AN ORGANIZED HEALTH CARE EDUCATION/TRAINING PROGRAM

## 2025-04-14 NOTE — PROCEDURES
Cystoscopy    Date/Time: 4/14/2025 9:00 AM    Performed by: Jemma Wiseman MD  Authorized by: Jemma Wiseman MD    Consent Done?:  Yes (Written)  Timeout: prior to procedure the correct patient, procedure, and site was verified    Prep: patient was prepped and draped in usual sterile fashion    Anesthesia:  Intraurethral instillation  Local anesthetic:  Topical anesthetic  Indications: hematuria    Position:  Supine  Anesthesia:  Intraurethral instillation  Preparation: Patient was prepped and draped in usual sterile fashion    Scope type:  Flexible cystoscope  External exam normal: Yes    Urethra normal: Yes    Prostate normal: Yes (median lobe noted)     patient tolerated the procedure well with no immediate complications  Comments:      Mild LUTS  RTC 3 months

## 2025-04-18 ENCOUNTER — HOSPITAL ENCOUNTER (EMERGENCY)
Facility: HOSPITAL | Age: 70
Discharge: HOME | End: 2025-04-19
Attending: EMERGENCY MEDICINE
Payer: MEDICARE

## 2025-04-18 DIAGNOSIS — D64.9 ANEMIA, UNSPECIFIED TYPE: ICD-10-CM

## 2025-04-18 DIAGNOSIS — R05.9 COUGH: Primary | ICD-10-CM

## 2025-04-18 DIAGNOSIS — R06.02 SOB (SHORTNESS OF BREATH): ICD-10-CM

## 2025-04-18 LAB
ABSOLUTE EOSINOPHIL (OHS): 0.07 K/UL
ABSOLUTE MONOCYTE (OHS): 0.65 K/UL (ref 0.3–1)
ABSOLUTE NEUTROPHIL COUNT (OHS): 7.71 K/UL (ref 1.8–7.7)
ALBUMIN SERPL BCP-MCNC: 2.8 G/DL (ref 3.5–5.2)
ALP SERPL-CCNC: 78 UNIT/L (ref 40–150)
ALT SERPL W/O P-5'-P-CCNC: 32 UNIT/L (ref 10–44)
ANION GAP (OHS): 12 MMOL/L (ref 8–16)
AST SERPL-CCNC: 22 UNIT/L (ref 11–45)
BACTERIA #/AREA URNS AUTO: ABNORMAL /HPF
BASOPHILS # BLD AUTO: 0.01 K/UL
BASOPHILS NFR BLD AUTO: 0.1 %
BILIRUB SERPL-MCNC: 0.4 MG/DL (ref 0.1–1)
BILIRUB UR QL STRIP.AUTO: NEGATIVE
BNP SERPL-MCNC: 82 PG/ML (ref 0–99)
BUN SERPL-MCNC: 16 MG/DL (ref 8–23)
CALCIUM SERPL-MCNC: 8.4 MG/DL (ref 8.7–10.5)
CHLORIDE SERPL-SCNC: 106 MMOL/L (ref 95–110)
CLARITY UR: CLEAR
CO2 SERPL-SCNC: 18 MMOL/L (ref 23–29)
COLOR UR AUTO: YELLOW
CREAT SERPL-MCNC: 1 MG/DL (ref 0.5–1.4)
CTP QC/QA: YES
CTP QC/QA: YES
ERYTHROCYTE [DISTWIDTH] IN BLOOD BY AUTOMATED COUNT: 12.4 % (ref 11.5–14.5)
GFR SERPLBLD CREATININE-BSD FMLA CKD-EPI: >60 ML/MIN/1.73/M2
GLUCOSE SERPL-MCNC: 107 MG/DL (ref 70–110)
GLUCOSE UR QL STRIP: NEGATIVE
HCT VFR BLD AUTO: 37.1 % (ref 40–54)
HGB BLD-MCNC: 12.3 GM/DL (ref 14–18)
HGB UR QL STRIP: ABNORMAL
HYALINE CASTS UR QL AUTO: 0 /LPF (ref 0–1)
IMM GRANULOCYTES # BLD AUTO: 0.02 K/UL (ref 0–0.04)
IMM GRANULOCYTES NFR BLD AUTO: 0.2 % (ref 0–0.5)
KETONES UR QL STRIP: NEGATIVE
LEUKOCYTE ESTERASE UR QL STRIP: NEGATIVE
LYMPHOCYTES # BLD AUTO: 1.05 K/UL (ref 1–4.8)
MAGNESIUM SERPL-MCNC: 1.9 MG/DL (ref 1.6–2.6)
MCH RBC QN AUTO: 30.1 PG (ref 27–31)
MCHC RBC AUTO-ENTMCNC: 33.2 G/DL (ref 32–36)
MCV RBC AUTO: 91 FL (ref 82–98)
MICROSCOPIC COMMENT: ABNORMAL
NITRITE UR QL STRIP: NEGATIVE
NUCLEATED RBC (/100WBC) (OHS): 0 /100 WBC
PH UR STRIP: 6 [PH]
PLATELET # BLD AUTO: 144 K/UL (ref 150–450)
PMV BLD AUTO: 8.6 FL (ref 9.2–12.9)
POC MOLECULAR INFLUENZA A AGN: NEGATIVE
POC MOLECULAR INFLUENZA B AGN: NEGATIVE
POTASSIUM SERPL-SCNC: 4.2 MMOL/L (ref 3.5–5.1)
PROCALCITONIN SERPL-MCNC: 0.79 NG/ML
PROT SERPL-MCNC: 6.6 GM/DL (ref 6–8.4)
PROT UR QL STRIP: ABNORMAL
RBC # BLD AUTO: 4.09 M/UL (ref 4.6–6.2)
RBC #/AREA URNS AUTO: 23 /HPF (ref 0–4)
RELATIVE EOSINOPHIL (OHS): 0.7 %
RELATIVE LYMPHOCYTE (OHS): 11 % (ref 18–48)
RELATIVE MONOCYTE (OHS): 6.8 % (ref 4–15)
RELATIVE NEUTROPHIL (OHS): 81.2 % (ref 38–73)
SARS-COV-2 RDRP RESP QL NAA+PROBE: NEGATIVE
SODIUM SERPL-SCNC: 136 MMOL/L (ref 136–145)
SP GR UR STRIP: 1.03
TROPONIN I SERPL DL<=0.01 NG/ML-MCNC: <0.006 NG/ML
UROBILINOGEN UR STRIP-ACNC: ABNORMAL EU/DL
WBC # BLD AUTO: 9.51 K/UL (ref 3.9–12.7)
WBC #/AREA URNS AUTO: 5 /HPF (ref 0–5)

## 2025-04-18 PROCEDURE — 87635 SARS-COV-2 COVID-19 AMP PRB: CPT | Performed by: PHYSICIAN ASSISTANT

## 2025-04-18 PROCEDURE — 83735 ASSAY OF MAGNESIUM: CPT | Performed by: PHYSICIAN ASSISTANT

## 2025-04-18 PROCEDURE — 84145 PROCALCITONIN (PCT): CPT | Performed by: PHYSICIAN ASSISTANT

## 2025-04-18 PROCEDURE — 99285 EMERGENCY DEPT VISIT HI MDM: CPT | Mod: 25

## 2025-04-18 PROCEDURE — 80053 COMPREHEN METABOLIC PANEL: CPT | Performed by: PHYSICIAN ASSISTANT

## 2025-04-18 PROCEDURE — 87502 INFLUENZA DNA AMP PROBE: CPT

## 2025-04-18 PROCEDURE — 81003 URINALYSIS AUTO W/O SCOPE: CPT | Performed by: PHYSICIAN ASSISTANT

## 2025-04-18 PROCEDURE — 93005 ELECTROCARDIOGRAM TRACING: CPT

## 2025-04-18 PROCEDURE — 83880 ASSAY OF NATRIURETIC PEPTIDE: CPT | Performed by: PHYSICIAN ASSISTANT

## 2025-04-18 PROCEDURE — 84484 ASSAY OF TROPONIN QUANT: CPT | Performed by: PHYSICIAN ASSISTANT

## 2025-04-18 PROCEDURE — 93010 ELECTROCARDIOGRAM REPORT: CPT | Mod: ,,, | Performed by: INTERNAL MEDICINE

## 2025-04-18 PROCEDURE — 85025 COMPLETE CBC W/AUTO DIFF WBC: CPT | Performed by: PHYSICIAN ASSISTANT

## 2025-04-18 RX ORDER — AZITHROMYCIN 250 MG/1
500 TABLET, FILM COATED ORAL
Status: COMPLETED | OUTPATIENT
Start: 2025-04-18 | End: 2025-04-19

## 2025-04-18 RX ORDER — CEFTRIAXONE 1 G/1
1 INJECTION, POWDER, FOR SOLUTION INTRAMUSCULAR; INTRAVENOUS
Status: COMPLETED | OUTPATIENT
Start: 2025-04-18 | End: 2025-04-19

## 2025-04-19 VITALS
HEART RATE: 77 BPM | OXYGEN SATURATION: 100 % | BODY MASS INDEX: 27.37 KG/M2 | TEMPERATURE: 100 F | DIASTOLIC BLOOD PRESSURE: 71 MMHG | RESPIRATION RATE: 18 BRPM | WEIGHT: 180 LBS | SYSTOLIC BLOOD PRESSURE: 127 MMHG

## 2025-04-19 LAB
HOLD SPECIMEN: NORMAL
OHS QRS DURATION: 90 MS
OHS QTC CALCULATION: 395 MS

## 2025-04-19 PROCEDURE — 96374 THER/PROPH/DIAG INJ IV PUSH: CPT

## 2025-04-19 PROCEDURE — 25500020 PHARM REV CODE 255: Performed by: EMERGENCY MEDICINE

## 2025-04-19 PROCEDURE — 63600175 PHARM REV CODE 636 W HCPCS: Performed by: PHYSICIAN ASSISTANT

## 2025-04-19 PROCEDURE — 63700000 PHARM REV CODE 250 ALT 637 W/O HCPCS: Performed by: PHYSICIAN ASSISTANT

## 2025-04-19 RX ORDER — AMOXICILLIN AND CLAVULANATE POTASSIUM 875; 125 MG/1; MG/1
1 TABLET, FILM COATED ORAL 2 TIMES DAILY
Qty: 10 TABLET | Refills: 0 | Status: SHIPPED | OUTPATIENT
Start: 2025-04-19 | End: 2025-04-24

## 2025-04-19 RX ORDER — CETIRIZINE HYDROCHLORIDE 10 MG/1
10 TABLET ORAL DAILY
Qty: 14 TABLET | Refills: 0 | Status: SHIPPED | OUTPATIENT
Start: 2025-04-19 | End: 2025-05-03

## 2025-04-19 RX ORDER — AZITHROMYCIN 250 MG/1
250 TABLET, FILM COATED ORAL DAILY
Qty: 4 TABLET | Refills: 0 | Status: SHIPPED | OUTPATIENT
Start: 2025-04-19 | End: 2025-04-23

## 2025-04-19 RX ORDER — PANTOPRAZOLE SODIUM 20 MG/1
20 TABLET, DELAYED RELEASE ORAL DAILY
Qty: 30 TABLET | Refills: 0 | Status: SHIPPED | OUTPATIENT
Start: 2025-04-19 | End: 2026-04-19

## 2025-04-19 RX ORDER — GUAIFENESIN AND DEXTROMETHORPHAN HYDROBROMIDE 10; 100 MG/5ML; MG/5ML
10 SYRUP ORAL 4 TIMES DAILY PRN
Qty: 120 ML | Refills: 0 | Status: SHIPPED | OUTPATIENT
Start: 2025-04-19 | End: 2025-04-29

## 2025-04-19 RX ADMIN — CEFTRIAXONE 1 G: 1 INJECTION, POWDER, FOR SOLUTION INTRAMUSCULAR; INTRAVENOUS at 01:04

## 2025-04-19 RX ADMIN — IOHEXOL 95 ML: 350 INJECTION, SOLUTION INTRAVENOUS at 12:04

## 2025-04-19 RX ADMIN — AZITHROMYCIN DIHYDRATE 500 MG: 250 TABLET ORAL at 01:04

## 2025-04-19 NOTE — ED PROVIDER NOTES
Encounter Date: 4/18/2025       History     Chief Complaint   Patient presents with    Cough     X A COUPLE DAYS, mostly dry some time productive, sob and fatigue     71yo M presents to ED with chief complaint persistent cough, fatigue, fever, chills x 2w.    Pt began on/around 4/4 with cough, fatigue, fever, chills, generally feeling unwell. Was seen in this ED on 4/6, diagnosed with RUL pneumonia, finished 10d of Doxy; pt states no improvement in symptoms.  Admits to frequent cough, sometimes productive of yellow sputum.  Admits to occasional fever, chills, associated fatigue, generally feeling unwell.  Has been taking previously prescribed Tessalon without significant improvement of cough.  He admits to shortness of breath; states shortness breath not worsened when he lies recumbent, not worsened with walking.  Denies chest pain. Denies a history of COPD or any lung issues.  Nonsmoker.  Denies personal history of ACS.  No hypertension, hyperlipidemia, or DM.  Denies any leg swelling or calf pain.  No history of VTE.  Denies orthopnea or paroxysmal nocturnal dyspnea.  Denies history of CHF.  Decreased appetite and intake since onset of symptoms.  He admits to nocturia, hesitancy x 1-2mo; recently seen by , cystoscopy with LUTS; no acute urinary complaints.  No abdominal pain.  Denies diarrhea.  No nausea vomiting.  Denies any alleviating or exacerbating factors.  No radiation of symptoms.    No daily prescription medications    PMH:  Hematuria  LUTS  Remote history hypertension, hyperlipidemia      Review of patient's allergies indicates:  No Known Allergies  History reviewed. No pertinent past medical history.  History reviewed. No pertinent surgical history.  No family history on file.  Social History[1]  Review of Systems   Constitutional:  Positive for appetite change, chills, fatigue and fever.   Eyes:  Negative for discharge and redness.   Respiratory:  Positive for cough and shortness of breath.     Cardiovascular:  Negative for chest pain and leg swelling.   Gastrointestinal:  Negative for abdominal pain, diarrhea, nausea and vomiting.   Genitourinary:  Negative for decreased urine volume and dysuria.        Nocturia, hesitancy---both chronic   Musculoskeletal:  Negative for neck pain and neck stiffness.   Neurological:  Negative for syncope.       Physical Exam     Initial Vitals [04/18/25 2149]   BP Pulse Resp Temp SpO2   133/70 80 16 98.6 °F (37 °C) 100 %      MAP       --         Physical Exam    Nursing note and vitals reviewed.  Constitutional: He appears well-developed and well-nourished. He is not diaphoretic. No distress.   Overall well-appearing and nontoxic.  Speaking in full sentences without pause or difficulty.   HENT:   Head: Normocephalic and atraumatic.   Neck: Neck supple.   Normal range of motion.  Cardiovascular:            Normal sinus rhythm.  2+ DP bilaterally.  No unilateral leg swelling or calf tenderness, no pitting pretibial edema.   Pulmonary/Chest: No respiratory distress.   Very faint expiratory wheeze to right upper lung zone.  No hypoxia on room air, no tachypnea, no accessory muscle use.   Musculoskeletal:         General: No tenderness. Normal range of motion.      Cervical back: Normal range of motion and neck supple.     Neurological: He is alert and oriented to person, place, and time. GCS score is 15. GCS eye subscore is 4. GCS verbal subscore is 5. GCS motor subscore is 6.   Skin: Skin is warm.   Psychiatric: He has a normal mood and affect. Thought content normal.         ED Course   Procedures  Labs Reviewed   COMPREHENSIVE METABOLIC PANEL - Abnormal       Result Value    Sodium 136      Potassium 4.2      Chloride 106      CO2 18 (*)     Glucose 107      BUN 16      Creatinine 1.0      Calcium 8.4 (*)     Protein Total 6.6      Albumin 2.8 (*)     Bilirubin Total 0.4      ALP 78      AST 22      ALT 32      Anion Gap 12      eGFR >60     PROCALCITONIN - Abnormal     Procalcitonin 0.79 (*)    CBC WITH DIFFERENTIAL - Abnormal    WBC 9.51      RBC 4.09 (*)     HGB 12.3 (*)     HCT 37.1 (*)     MCV 91      MCH 30.1      MCHC 33.2      RDW 12.4      Platelet Count 144 (*)     MPV 8.6 (*)     Nucleated RBC 0      Neut % 81.2 (*)     Lymph % 11.0 (*)     Mono % 6.8      Eos % 0.7      Basophil % 0.1      Imm Grans % 0.2      Neut # 7.71 (*)     Lymph # 1.05      Mono # 0.65      Eos # 0.07      Baso # 0.01      Imm Grans # 0.02     URINALYSIS, REFLEX TO URINE CULTURE - Abnormal    Color, UA Yellow      Appearance, UA Clear      pH, UA 6.0      Spec Grav UA 1.030      Protein, UA 1+ (*)     Glucose, UA Negative      Ketones, UA Negative      Bilirubin, UA Negative      Blood, UA 2+ (*)     Nitrites, UA Negative      Urobilinogen, UA 2.0-3.0 (*)     Leukocyte Esterase, UA Negative     URINALYSIS MICROSCOPIC - Abnormal    RBC, UA 23 (*)     WBC, UA 5      Bacteria, UA Occasional      Hyaline Casts, UA 0      Microscopic Comment       MAGNESIUM - Normal    Magnesium  1.9     TROPONIN I - Normal    Troponin-I <0.006     B-TYPE NATRIURETIC PEPTIDE - Normal    BNP 82     CBC W/ AUTO DIFFERENTIAL    Narrative:     The following orders were created for panel order CBC auto differential.  Procedure                               Abnormality         Status                     ---------                               -----------         ------                     CBC with Differential[[id number]]       Abnormal            Final result                 Please view results for these tests on the individual orders.   GREY TOP URINE HOLD    Extra Tube Hold for add-ons.     POCT INFLUENZA A/B MOLECULAR    POC Molecular Influenza A Ag Negative      POC Molecular Influenza B Ag Negative       Acceptable Yes     SARS-COV-2 RDRP GENE    POC Rapid COVID Negative       Acceptable Yes       EKG Readings: (Independently Interpreted)   Normal sinus rhythm, ventricular rate 75 beats  per minute.  Normal GA, normal QT, normal QRS duration.  No right axis deviation.  No convincing ST elevation.  Question mild LVH.  Appears grossly similar previous dated 06/06/2024.       Imaging Results              CTA Chest Non-Coronary (PE Studies) (Final result)  Result time 04/19/25 00:43:51      Final result by Nikita Vallejo MD (04/19/25 00:43:51)                   Impression:      No evidence of PE.      Electronically signed by: Nikita Vallejo MD  Date:    04/19/2025  Time:    00:43               Narrative:    EXAMINATION:  CTA CHEST NON CORONARY (PE STUDIES)    CLINICAL HISTORY:  Pulmonary embolism (PE) suspected, unknown D-dimer;    TECHNIQUE:  Low dose axial images, sagittal and coronal reformations were obtained from the thoracic inlet to the lung bases following the IV administration of 100 mL of Omnipaque 350.  Contrast timing was optimized to evaluate the pulmonary arteries.  MIP images were performed.    COMPARISON:  None    FINDINGS:  Structures at the base of the neck are unremarkable.  Aorta is non-aneurysmal.  The heart is normal in size without pericardial effusion.  No intraluminal filling defects within the pulmonary arteries to suggest pulmonary thromboembolism.   There is no evidence of mediastinal, axillary, or hilar lymph node enlargement.  The esophagus is unremarkable along its course.    The trachea and bronchi are patent.  The lungs are symmetrically expanded.  There is minimal bibasilar atelectasis.  Lungs show no consolidation.  No pleural effusion.  No evidence to suggest pulmonary hemorrhage or infarction.    The visualized abdominal structures show no acute abnormalities.  Right renal hypodense lesion, probable cyst is noted.  No acute osseous abnormality identified.  There is mild bilateral gynecomastia.                                       X-Ray Chest 1 View (Final result)  Result time 04/18/25 22:31:07      Final result by Nikita Vallejo MD (04/18/25 22:31:07)                    Impression:      No acute cardiopulmonary process identified.      Electronically signed by: Nikita Vallejo MD  Date:    04/18/2025  Time:    22:31               Narrative:    EXAMINATION:  XR CHEST 1 VIEW    CLINICAL HISTORY:  Cough, unspecified    TECHNIQUE:  Single frontal view of the chest was performed.    COMPARISON:  04/06/2025.    FINDINGS:  Cardiac silhouette is normal in size.  Lungs are symmetrically expanded.  No evidence of new focal consolidative process, pneumothorax, or significant pleural effusion.  No acute osseous abnormality identified.                                    X-Rays:   Independently Interpreted Readings:   Chest X-Ray: Personal interpretation:  Cardiomegaly, no convincing large effusion, no obvious pneumothorax, abnormal appearance of mediastinum, right upper perihilar region.  Improvement of previous right lower lobe consolidative changes.     Medications   cefTRIAXone injection 1 g (1 g Intravenous Given 4/19/25 0126)   azithromycin tablet 500 mg (500 mg Oral Given 4/19/25 0131)   iohexoL (OMNIPAQUE 350) injection 95 mL (95 mLs Intravenous Given 4/19/25 0009)     Medical Decision Making  Differential diagnosis:  Community-acquired pneumonia, PE, ACS, bronchitis, failure of outpatient treatment, pneumothorax, pleural effusion, CHF    Amount and/or Complexity of Data Reviewed  External Data Reviewed: labs, radiology and notes.  Labs: ordered. Decision-making details documented in ED Course.  Radiology: ordered and independent interpretation performed. Decision-making details documented in ED Course.  ECG/medicine tests: ordered and independent interpretation performed. Decision-making details documented in ED Course.  Discussion of management or test interpretation with external provider(s): No history of thrombophilia.  No recent surgery.  No major trauma. No recent immobility.  No active cancer.  No exogenous estrogen. No history of percutaneous indwelling catheter. No  previous DVT/PE.  No significant tachycardia.  No hypoxia.  No pleuritic complaints.  Low suspicion for PE as culprit of productive cough and persistent shortness of breath.    Reassuring HEART score.  Denies chest pain.  Persistent cough.  Lungs grossly clear, some right upper lobe expiratory wheeze without hypoxia.  Denies worsening of symptoms with ambulation.  No evidence of volume overload on exam or imaging.  Denies improvement with doxycycline.  I would like to treat a possible occult pneumonia with both atypical coverage as well as typical coverage.  Elevated procalcitonin---will give Rocephin in the ED, plan to discharge with Augmentin and azithromycin.  However, unsure if pneumonia or significant infection as culprit of persistent symptoms.  No exertional complaints.  PE felt less likely.  However, I have strongly encouraged him to establish care with a primary care provider as an outpatient.  Chest x-ray without convincing dense peripheral consolidation, however the upper mediastinum does appear bit abnormal to me---no significant hypertension, no ripping/shearing discomfort, symmetric pulses, no convincing symptoms or presentation suspicious for aortic dissection at this time.  Given persistent symptoms, elevated procal, reported persistent fever/chills, will order CTA for evaluation of lung parenchyma and mediastinum.     Risk  OTC drugs.  Prescription drug management.      Additional MDM:   Heart Score:    History:          Slightly suspicious.  ECG:             Normal  Age:               >65 years  Risk factors: 1-2 risk factors  Troponin:       Less than or equal to normal limit  Heart Score = 3                ED Course as of 04/18/25 2339 Fri Apr 18, 2025   2254 PVR 6mL [SM]   2332 Workup grossly unremarkable and reassuring.  We will continue with plan of antibiotics in setting of persistent cough, reported fever chills, as well as elevated procalcitonin despite negative imaging---discharge with  Augmentin and azithromycin.  Strongly encouraged establishing care with a local primary care provider.  Referral placed, given information for outpatient follow up.  Repeat vitals remain reassuring, hemodynamically stable.  Patient and wife do feel comfortable with current plan.   [SM]      ED Course User Index  [SM] Alfred Wise PA-C                           Clinical Impression:  Final diagnoses:  [R05.9] Cough (Primary)  [R06.02] SOB (shortness of breath)  [D64.9] Anemia, unspecified type          ED Disposition Condition    Discharge Good          ED Prescriptions       Medication Sig Dispense Start Date End Date Auth. Provider    dextromethorphan-guaiFENesin  mg/5 ml (ROBITUSSIN-DM)  mg/5 mL liquid Take 10 mLs by mouth 4 (four) times daily as needed (cough). 120 mL 4/19/2025 4/29/2025 Alfred Wise PA-C    azithromycin (Z-KY) 250 MG tablet Take 1 tablet (250 mg total) by mouth once daily. for 4 days 4 tablet 4/19/2025 4/23/2025 Alfred Wise PA-C    amoxicillin-clavulanate 875-125mg (AUGMENTIN) 875-125 mg per tablet Take 1 tablet by mouth 2 (two) times daily. for 5 days 10 tablet 4/19/2025 4/24/2025 Alfred Wise PA-C    pantoprazole (PROTONIX) 20 MG tablet Take 1 tablet (20 mg total) by mouth once daily. 30 tablet 4/19/2025 4/19/2026 Alfred Wise PA-C    cetirizine (ZYRTEC) 10 MG tablet Take 1 tablet (10 mg total) by mouth once daily. for 14 days 14 tablet 4/19/2025 5/3/2025 Alfred Wise PA-C          Follow-up Information       Follow up With Specialties Details Why Contact Info    Imer Ramires MD Family Medicine, Wound Care Schedule an appointment as soon as possible for a visit  To establish primary care physician, For reevaluation [address] [facility]  [place] [address] [phone]      [facility] Internal Medicine Schedule an appointment as soon as possible for a visit  To establish primary care physician, For reevaluation [address]  [facility] [place] [address]  [phone]                   [1]   Social History  Tobacco Use    Smoking status: Never    Smokeless tobacco: Never        Alfred Wise, PA-C  04/19/25 0622

## 2025-04-19 NOTE — ED TRIAGE NOTES
Pt reports cough for several days.  He was treated for pneumonia on 04/06/25.  Denies medical problems.

## 2025-04-19 NOTE — DISCHARGE INSTRUCTIONS
Kòmanse pran nathan de antibyotik roderick sa ekri a; azithromycin yon fwa chak trevor, Augmentin de fwa pa trevor --- pran tout demetrio nan nathan de antibyotik. Robitussin roderick sa nesesè abraham tous. Kòmanse pran Zyrtec yon fwa chak trevor. Kòmanse pran Protonix yon fwa chak trevor. Tanpri swiv epi etabli swen ak yon founisè swen prensipal lokal. Retounen nan ED sa a si touse min pi grav, si souf kout min pi grav, si w devlope doulè nan pwatrin, si w kòmanse ak lafyèv, si pa gen [facility] amelyorasyon ak plan aktyèl la, si nenpòt lòt pwoblèm rive.    Begin taking both antibiotics as written; azithromycin once daily, Augmentin twice daily---take entire course of both antibiotics.  Robitussin as needed for cough.  Begin taking Zyrtec once daily.  Begin taking Protonix once daily. Please follow up and establish care with a local primary care provider. Return to this ED if worsening cough, if worsening shortness of breath, if you develop chest pain, if you begin with fever, if no improvement with current plan, if any other problems occur.

## 2025-07-11 ENCOUNTER — PATIENT MESSAGE (OUTPATIENT)
Dept: UROLOGY | Facility: CLINIC | Age: 70
End: 2025-07-11
Payer: MEDICARE